# Patient Record
Sex: FEMALE | Race: WHITE | Employment: FULL TIME | URBAN - METROPOLITAN AREA
[De-identification: names, ages, dates, MRNs, and addresses within clinical notes are randomized per-mention and may not be internally consistent; named-entity substitution may affect disease eponyms.]

---

## 2017-06-14 ENCOUNTER — HOSPITAL ENCOUNTER (EMERGENCY)
Age: 47
Discharge: HOME | End: 2017-06-14
Attending: EMERGENCY MEDICINE
Payer: COMMERCIAL

## 2017-06-14 VITALS
WEIGHT: 136.24 LBS | HEART RATE: 88 BPM | OXYGEN SATURATION: 99 % | HEIGHT: 66 IN | TEMPERATURE: 98.3 F | DIASTOLIC BLOOD PRESSURE: 46 MMHG | RESPIRATION RATE: 12 BRPM | BODY MASS INDEX: 21.9 KG/M2 | SYSTOLIC BLOOD PRESSURE: 95 MMHG

## 2017-06-14 DIAGNOSIS — F43.0 ACUTE STRESS REACTION: ICD-10-CM

## 2017-06-14 DIAGNOSIS — R11.0 NAUSEA WITHOUT VOMITING: Primary | ICD-10-CM

## 2017-06-14 DIAGNOSIS — F19.10 SUBSTANCE ABUSE (HCC): ICD-10-CM

## 2017-06-14 LAB
ALBUMIN SERPL BCP-MCNC: 3.4 G/DL (ref 3.5–5)
ALBUMIN/GLOB SERPL: 0.9 {RATIO} (ref 1.1–2.2)
ALP SERPL-CCNC: 44 U/L (ref 45–117)
ALT SERPL-CCNC: 19 U/L (ref 12–78)
ANION GAP BLD CALC-SCNC: 15 MMOL/L (ref 5–15)
ANION GAP BLD CALC-SCNC: 6 MMOL/L (ref 5–15)
APPEARANCE UR: ABNORMAL
AST SERPL W P-5'-P-CCNC: 33 U/L (ref 15–37)
BACTERIA URNS QL MICRO: ABNORMAL /HPF
BASOPHILS # BLD AUTO: 0 K/UL (ref 0–0.1)
BASOPHILS # BLD: 0 % (ref 0–1)
BILIRUB SERPL-MCNC: 0.3 MG/DL (ref 0.2–1)
BILIRUB UR QL: NEGATIVE
BUN BLD-MCNC: 10 MG/DL (ref 9–20)
BUN SERPL-MCNC: 9 MG/DL (ref 6–20)
BUN/CREAT SERPL: 16 (ref 12–20)
CA-I BLD-MCNC: 1.05 MMOL/L (ref 1.12–1.32)
CALCIUM SERPL-MCNC: 9.1 MG/DL (ref 8.5–10.1)
CHLORIDE BLD-SCNC: 106 MMOL/L (ref 98–107)
CHLORIDE SERPL-SCNC: 107 MMOL/L (ref 97–108)
CO2 BLD-SCNC: 25 MMOL/L (ref 21–32)
CO2 SERPL-SCNC: 25 MMOL/L (ref 21–32)
COLOR UR: ABNORMAL
CREAT BLD-MCNC: 0.5 MG/DL (ref 0.6–1.3)
CREAT SERPL-MCNC: 0.56 MG/DL (ref 0.55–1.02)
EOSINOPHIL # BLD: 0.1 K/UL (ref 0–0.4)
EOSINOPHIL NFR BLD: 1 % (ref 0–7)
EPITH CASTS URNS QL MICRO: ABNORMAL /LPF
ERYTHROCYTE [DISTWIDTH] IN BLOOD BY AUTOMATED COUNT: 12.7 % (ref 11.5–14.5)
GLOBULIN SER CALC-MCNC: 3.8 G/DL (ref 2–4)
GLUCOSE BLD-MCNC: 139 MG/DL (ref 65–100)
GLUCOSE SERPL-MCNC: 75 MG/DL (ref 65–100)
GLUCOSE UR STRIP.AUTO-MCNC: NEGATIVE MG/DL
HCG UR QL: NEGATIVE
HCT VFR BLD AUTO: 40.9 % (ref 35–47)
HCT VFR BLD CALC: 36 % (ref 35–47)
HGB BLD-MCNC: 12.2 GM/DL (ref 11.5–16)
HGB BLD-MCNC: 13.6 G/DL (ref 11.5–16)
HGB UR QL STRIP: ABNORMAL
KETONES UR QL STRIP.AUTO: ABNORMAL MG/DL
LEUKOCYTE ESTERASE UR QL STRIP.AUTO: NEGATIVE
LIPASE SERPL-CCNC: 102 U/L (ref 73–393)
LYMPHOCYTES # BLD AUTO: 31 % (ref 12–49)
LYMPHOCYTES # BLD: 2.4 K/UL (ref 0.8–3.5)
MCH RBC QN AUTO: 29.6 PG (ref 26–34)
MCHC RBC AUTO-ENTMCNC: 33.3 G/DL (ref 30–36.5)
MCV RBC AUTO: 88.9 FL (ref 80–99)
MONOCYTES # BLD: 0.6 K/UL (ref 0–1)
MONOCYTES NFR BLD AUTO: 7 % (ref 5–13)
NEUTS SEG # BLD: 4.7 K/UL (ref 1.8–8)
NEUTS SEG NFR BLD AUTO: 61 % (ref 32–75)
NITRITE UR QL STRIP.AUTO: NEGATIVE
PH UR STRIP: 6 [PH] (ref 5–8)
PLATELET # BLD AUTO: 341 K/UL (ref 150–400)
POTASSIUM BLD-SCNC: 5.1 MMOL/L (ref 3.5–5.1)
POTASSIUM SERPL-SCNC: 5.6 MMOL/L (ref 3.5–5.1)
PROT SERPL-MCNC: 7.2 G/DL (ref 6.4–8.2)
PROT UR STRIP-MCNC: ABNORMAL MG/DL
RBC # BLD AUTO: 4.6 M/UL (ref 3.8–5.2)
RBC #/AREA URNS HPF: ABNORMAL /HPF (ref 0–5)
SERVICE CMNT-IMP: ABNORMAL
SODIUM BLD-SCNC: 140 MMOL/L (ref 136–145)
SODIUM SERPL-SCNC: 138 MMOL/L (ref 136–145)
SP GR UR REFRACTOMETRY: 1.02 (ref 1–1.03)
UA: UC IF INDICATED,UAUC: ABNORMAL
UROBILINOGEN UR QL STRIP.AUTO: 0.2 EU/DL (ref 0.2–1)
WBC # BLD AUTO: 7.9 K/UL (ref 3.6–11)
WBC URNS QL MICRO: ABNORMAL /HPF (ref 0–4)

## 2017-06-14 PROCEDURE — 96375 TX/PRO/DX INJ NEW DRUG ADDON: CPT

## 2017-06-14 PROCEDURE — 36415 COLL VENOUS BLD VENIPUNCTURE: CPT | Performed by: PHYSICIAN ASSISTANT

## 2017-06-14 PROCEDURE — 99284 EMERGENCY DEPT VISIT MOD MDM: CPT

## 2017-06-14 PROCEDURE — 81001 URINALYSIS AUTO W/SCOPE: CPT | Performed by: PHYSICIAN ASSISTANT

## 2017-06-14 PROCEDURE — 96361 HYDRATE IV INFUSION ADD-ON: CPT

## 2017-06-14 PROCEDURE — 80047 BASIC METABLC PNL IONIZED CA: CPT

## 2017-06-14 PROCEDURE — 80053 COMPREHEN METABOLIC PANEL: CPT | Performed by: PHYSICIAN ASSISTANT

## 2017-06-14 PROCEDURE — 81025 URINE PREGNANCY TEST: CPT | Performed by: PHYSICIAN ASSISTANT

## 2017-06-14 PROCEDURE — 87086 URINE CULTURE/COLONY COUNT: CPT | Performed by: PHYSICIAN ASSISTANT

## 2017-06-14 PROCEDURE — 93005 ELECTROCARDIOGRAM TRACING: CPT

## 2017-06-14 PROCEDURE — 74011250637 HC RX REV CODE- 250/637: Performed by: EMERGENCY MEDICINE

## 2017-06-14 PROCEDURE — 74011250636 HC RX REV CODE- 250/636

## 2017-06-14 PROCEDURE — 85025 COMPLETE CBC W/AUTO DIFF WBC: CPT | Performed by: PHYSICIAN ASSISTANT

## 2017-06-14 PROCEDURE — 96374 THER/PROPH/DIAG INJ IV PUSH: CPT

## 2017-06-14 PROCEDURE — 74011250636 HC RX REV CODE- 250/636: Performed by: EMERGENCY MEDICINE

## 2017-06-14 PROCEDURE — 83690 ASSAY OF LIPASE: CPT | Performed by: PHYSICIAN ASSISTANT

## 2017-06-14 RX ORDER — CLONIDINE HYDROCHLORIDE 0.1 MG/1
0.1 TABLET ORAL
Status: COMPLETED | OUTPATIENT
Start: 2017-06-14 | End: 2017-06-14

## 2017-06-14 RX ORDER — HYDROXYZINE 25 MG/1
25 TABLET, FILM COATED ORAL
Qty: 15 TAB | Refills: 0 | Status: SHIPPED | OUTPATIENT
Start: 2017-06-14 | End: 2017-06-14

## 2017-06-14 RX ORDER — CLONIDINE HYDROCHLORIDE 0.1 MG/1
0.1 TABLET ORAL
Qty: 14 TAB | Refills: 0 | Status: SHIPPED | OUTPATIENT
Start: 2017-06-14 | End: 2017-09-20 | Stop reason: ALTCHOICE

## 2017-06-14 RX ORDER — HYDROXYZINE 25 MG/1
25 TABLET, FILM COATED ORAL
Status: COMPLETED | OUTPATIENT
Start: 2017-06-14 | End: 2017-06-14

## 2017-06-14 RX ORDER — ONDANSETRON 2 MG/ML
4 INJECTION INTRAMUSCULAR; INTRAVENOUS
Status: COMPLETED | OUTPATIENT
Start: 2017-06-14 | End: 2017-06-14

## 2017-06-14 RX ORDER — LORAZEPAM 0.5 MG/1
1 TABLET ORAL
Qty: 6 TAB | Refills: 0 | Status: SHIPPED | OUTPATIENT
Start: 2017-06-14 | End: 2017-06-16

## 2017-06-14 RX ORDER — CLONIDINE HYDROCHLORIDE 0.1 MG/1
0.1 TABLET ORAL
Qty: 14 TAB | Refills: 0 | Status: SHIPPED | OUTPATIENT
Start: 2017-06-14 | End: 2017-06-14

## 2017-06-14 RX ORDER — HYDROXYZINE 25 MG/1
25 TABLET, FILM COATED ORAL
Qty: 15 TAB | Refills: 0 | Status: SHIPPED | OUTPATIENT
Start: 2017-06-14 | End: 2017-06-19

## 2017-06-14 RX ORDER — ONDANSETRON 2 MG/ML
INJECTION INTRAMUSCULAR; INTRAVENOUS
Status: COMPLETED
Start: 2017-06-14 | End: 2017-06-14

## 2017-06-14 RX ORDER — LORAZEPAM 2 MG/ML
1 INJECTION INTRAMUSCULAR ONCE
Status: COMPLETED | OUTPATIENT
Start: 2017-06-14 | End: 2017-06-14

## 2017-06-14 RX ORDER — SODIUM CHLORIDE 9 MG/ML
2000 INJECTION, SOLUTION INTRAVENOUS ONCE
Status: COMPLETED | OUTPATIENT
Start: 2017-06-14 | End: 2017-06-14

## 2017-06-14 RX ADMIN — LORAZEPAM 1 MG: 2 INJECTION INTRAMUSCULAR; INTRAVENOUS at 22:03

## 2017-06-14 RX ADMIN — HYDROXYZINE HYDROCHLORIDE 25 MG: 25 TABLET, FILM COATED ORAL at 21:18

## 2017-06-14 RX ADMIN — CLONIDINE HYDROCHLORIDE 0.1 MG: 0.1 TABLET ORAL at 22:39

## 2017-06-14 RX ADMIN — ONDANSETRON 4 MG: 2 INJECTION INTRAMUSCULAR; INTRAVENOUS at 21:18

## 2017-06-14 RX ADMIN — SODIUM CHLORIDE 2000 ML: 900 INJECTION, SOLUTION INTRAVENOUS at 21:17

## 2017-06-14 RX ADMIN — ONDANSETRON HYDROCHLORIDE 4 MG: 2 INJECTION, SOLUTION INTRAMUSCULAR; INTRAVENOUS at 21:18

## 2017-06-14 RX ADMIN — CLONIDINE HYDROCHLORIDE 0.1 MG: 0.1 TABLET ORAL at 21:18

## 2017-06-14 NOTE — LETTER
[address] 
[facility] EMERGENCY DEPT 
[address] [place] [address] [address] [id number] [phone] Work/School Note Date: 6/14/2017 To Whom It May concern: 
 
Radha Barrios was seen and treated today in the emergency room by the following provider(s): 
Attending Provider: Chaim Birch. Blake Lennox, MD. Radha Barrios may return to work on 6/16/17. Sincerely, Chaim Birch. Blake Lennox, MD

## 2017-06-15 LAB
ATRIAL RATE: 88 BPM
CALCULATED P AXIS, ECG09: 67 DEGREES
CALCULATED R AXIS, ECG10: 73 DEGREES
CALCULATED T AXIS, ECG11: 52 DEGREES
DIAGNOSIS, 93000: NORMAL
P-R INTERVAL, ECG05: 126 MS
Q-T INTERVAL, ECG07: 352 MS
QRS DURATION, ECG06: 82 MS
QTC CALCULATION (BEZET), ECG08: 425 MS
VENTRICULAR RATE, ECG03: 88 BPM

## 2017-06-15 NOTE — ED PROVIDER NOTES
HPI Comments: Megan Armas is a 48 y.o. female with no relevant PMHx who presents ambulatory to the ED c/o nausea and dry heaving since yesterday. Pt reports nausea has resolved since arrival to ED. She also notes anxiety stating her mother has ovarian cancer and she is facing marital problems and drug abuse. Pt states she takes suboxone and percocet. She also reports taking antidepressants including wellbutrin. Pt denies suicidal and homicidal ideation and intent, but admits that \"she rather be dead than feel like this. \" She specifically denies fever, chills, diarrhea, CP, cough, SOB, abdominal pain and HA. Social hx: - Tobacco use, - EtOH use, + Illicit drug use    PCP: Kanwal Sommer MD    There are no other complaints, changes or physical findings at this time. The history is provided by the patient. No  was used. Past Medical History:   Diagnosis Date    Sun-damaged skin     Sunburn, blistering     Tanning bed exposure        History reviewed. No pertinent surgical history. History reviewed. No pertinent family history. Social History     Social History    Marital status: UNKNOWN     Spouse name: N/A    Number of children: N/A    Years of education: N/A     Occupational History    Not on file. Social History Main Topics    Smoking status: Never Smoker    Smokeless tobacco: Never Used    Alcohol use Yes    Drug use: Yes     Special: Cocaine, Prescription    Sexual activity: Not on file     Other Topics Concern    Not on file     Social History Narrative         ALLERGIES: Sulfa (sulfonamide antibiotics)    Review of Systems   Constitutional: Negative for chills and fever. HENT: Negative for congestion, ear pain, rhinorrhea and sore throat. Eyes: Negative for pain and visual disturbance. Respiratory: Negative for cough, chest tightness, shortness of breath and wheezing.     Cardiovascular: Negative for chest pain, palpitations and leg swelling. Gastrointestinal: Positive for nausea. Negative for abdominal pain, blood in stool, constipation and diarrhea. Positive for dry heaving   Endocrine: Negative for polyuria. Genitourinary: Negative for dysuria, frequency and hematuria. Musculoskeletal: Negative for back pain, myalgias and neck pain. Skin: Negative for color change and rash. Allergic/Immunologic: Negative for immunocompromised state. Neurological: Negative for dizziness, light-headedness, numbness and headaches. Psychiatric/Behavioral: Negative for suicidal ideas. The patient is nervous/anxious. Vitals:    06/14/17 2215 06/14/17 2216 06/14/17 2239 06/14/17 2307   BP: 108/63  108/63 95/46   Pulse:   88    Resp:    12   Temp:       SpO2:  99%     Weight:       Height:                Physical Exam   Nursing note and vitals reviewed. General appearance: non-toxic, NAD  Eyes: PERRL, EOMI, conjunctiva normal, anicteric sclera  HEENT: mucous membranes moist, oropharynx is clear  Pulmonary: clear to auscultation bilaterally  Cardiac: normal rate and regular rhythm, no murmurs, gallops, or rubs, 2+DP pulses, 2+ radial pulses  Abdomen: soft, nontender, nondistended, bowel sounds present  MSK: no pre-tibial edema  Neuro: Alert, answers questions appropriately  Skin: capillary refill brisk      MDM  Number of Diagnoses or Management Options  Acute stress reaction:   Nausea without vomiting:   Substance abuse:   Diagnosis management comments: DDx: depression, anxiety, substance abuse, narcotic withdrawal, pancreatitis    Feels improved; labs reviewed. No SI/HI, pt states that she needs to leave her current living situation w/ her . Agreeable w/ POC to try & minimize w/drawal symptoms. Advised to f/u with PCP and substance abuse.        Amount and/or Complexity of Data Reviewed  Clinical lab tests: ordered and reviewed  Tests in the medicine section of CPT®: ordered and reviewed  Review and summarize past medical records: yes  Independent visualization of images, tracings, or specimens: yes    Patient Progress  Patient progress: stable    ED Course       Procedures    EKG interpretation: 18:44  Rhythm: normal sinus rhythm; and regular . Rate (approx.): 88 bpm; Axis: normal; ID interval: normal; QRS interval: normal ; ST/T wave: non specific ST/T changes. 10:29 PM  Pt reports feeling better and is ready to go home. LABORATORY TESTS:  Recent Results (from the past 12 hour(s))   EKG, 12 LEAD, INITIAL    Collection Time: 06/14/17  6:44 PM   Result Value Ref Range    Ventricular Rate 88 BPM    Atrial Rate 88 BPM    P-R Interval 126 ms    QRS Duration 82 ms    Q-T Interval 352 ms    QTC Calculation (Bezet) 425 ms    Calculated P Axis 67 degrees    Calculated R Axis 73 degrees    Calculated T Axis 52 degrees    Diagnosis       Normal sinus rhythm  Possible Left atrial enlargement  Nonspecific ST abnormality  No previous ECGs available     CBC WITH AUTOMATED DIFF    Collection Time: 06/14/17  8:44 PM   Result Value Ref Range    WBC 7.9 3.6 - 11.0 K/uL    RBC 4.60 3.80 - 5.20 M/uL    HGB 13.6 11.5 - 16.0 g/dL    HCT 40.9 35.0 - 47.0 %    MCV 88.9 80.0 - 99.0 FL    MCH 29.6 26.0 - 34.0 PG    MCHC 33.3 30.0 - 36.5 g/dL    RDW 12.7 11.5 - 14.5 %    PLATELET 767 787 - 832 K/uL    NEUTROPHILS 61 32 - 75 %    LYMPHOCYTES 31 12 - 49 %    MONOCYTES 7 5 - 13 %    EOSINOPHILS 1 0 - 7 %    BASOPHILS 0 0 - 1 %    ABS. NEUTROPHILS 4.7 1.8 - 8.0 K/UL    ABS. LYMPHOCYTES 2.4 0.8 - 3.5 K/UL    ABS. MONOCYTES 0.6 0.0 - 1.0 K/UL    ABS. EOSINOPHILS 0.1 0.0 - 0.4 K/UL    ABS.  BASOPHILS 0.0 0.0 - 0.1 K/UL   METABOLIC PANEL, COMPREHENSIVE    Collection Time: 06/14/17  8:44 PM   Result Value Ref Range    Sodium 138 136 - 145 mmol/L    Potassium 5.6 (H) 3.5 - 5.1 mmol/L    Chloride 107 97 - 108 mmol/L    CO2 25 21 - 32 mmol/L    Anion gap 6 5 - 15 mmol/L    Glucose 75 65 - 100 mg/dL    BUN 9 6 - 20 MG/DL    Creatinine 0.56 0.55 - 1.02 MG/DL BUN/Creatinine ratio 16 12 - 20      GFR est AA >60 >60 ml/min/1.73m2    GFR est non-AA >60 >60 ml/min/1.73m2    Calcium 9.1 8.5 - 10.1 MG/DL    Bilirubin, total 0.3 0.2 - 1.0 MG/DL    ALT (SGPT) 19 12 - 78 U/L    AST (SGOT) 33 15 - 37 U/L    Alk. phosphatase 44 (L) 45 - 117 U/L    Protein, total 7.2 6.4 - 8.2 g/dL    Albumin 3.4 (L) 3.5 - 5.0 g/dL    Globulin 3.8 2.0 - 4.0 g/dL    A-G Ratio 0.9 (L) 1.1 - 2.2     LIPASE    Collection Time: 06/14/17  8:44 PM   Result Value Ref Range    Lipase 102 73 - 393 U/L   URINALYSIS W/ REFLEX CULTURE    Collection Time: 06/14/17  8:44 PM   Result Value Ref Range    Color YELLOW/STRAW      Appearance CLOUDY (A) CLEAR      Specific gravity 1.025 1.003 - 1.030      pH (UA) 6.0 5.0 - 8.0      Protein TRACE (A) NEG mg/dL    Glucose NEGATIVE  NEG mg/dL    Ketone TRACE (A) NEG mg/dL    Bilirubin NEGATIVE  NEG      Blood MODERATE (A) NEG      Urobilinogen 0.2 0.2 - 1.0 EU/dL    Nitrites NEGATIVE  NEG      Leukocyte Esterase NEGATIVE  NEG      WBC 5-10 0 - 4 /hpf    RBC 10-20 0 - 5 /hpf    Epithelial cells MODERATE (A) FEW /lpf    Bacteria 1+ (A) NEG /hpf    UA:UC IF INDICATED URINE CULTURE ORDERED (A) CNI     HCG URINE, QL    Collection Time: 06/14/17  8:44 PM   Result Value Ref Range    HCG urine, Ql. NEGATIVE  NEG     POC CHEM8    Collection Time: 06/14/17 10:39 PM   Result Value Ref Range    Calcium, ionized (POC) 1.05 (L) 1.12 - 1.32 MMOL/L    Sodium (POC) 140 136 - 145 MMOL/L    Potassium (POC) 5.1 3.5 - 5.1 MMOL/L    Chloride (POC) 106 98 - 107 MMOL/L    CO2 (POC) 25 21 - 32 MMOL/L    Anion gap (POC) 15 5 - 15 mmol/L    Glucose (POC) 139 (H) 65 - 100 MG/DL    BUN (POC) 10 9 - 20 MG/DL    Creatinine (POC) 0.5 (L) 0.6 - 1.3 MG/DL    GFR-AA (POC) >60 >60 ml/min/1.73m2    GFR, non-AA (POC) >60 >60 ml/min/1.73m2    Hemoglobin (POC) 12.2 11.5 - 16.0 GM/DL    Hematocrit (POC) 36 35.0 - 47.0 %    Comment Comment Not Indicated.          MEDICATIONS GIVEN:  Medications   cloNIDine HCl (CATAPRES) tablet 0.1 mg (0.1 mg Oral Given 17)   0.9% sodium chloride infusion 2,000 mL (0 mL IntraVENous IV Completed 17)   hydrOXYzine HCl (ATARAX) tablet 25 mg (25 mg Oral Given 17)   ondansetron [facility]) injection 4 mg (4 mg IntraVENous Given 17)   LORazepam (ATIVAN) injection 1 mg (1 mg IntraVENous Given 17)   cloNIDine HCl (CATAPRES) tablet 0.1 mg (0.1 mg Oral Given 17)       IMPRESSION:  1. Nausea without vomiting    2. Substance abuse    3. Acute stress reaction        PLAN: Discharge home  1. Current Discharge Medication List      START taking these medications    Details   LORazepam (ATIVAN) 0.5 mg tablet Take 2 Tabs by mouth every eight (8) hours as needed for Anxiety for up to 2 days. Max Daily Amount: 3 mg. Indications: anxiety, NAUSEA  Qty: 6 Tab, Refills: 0      cloNIDine HCl (CATAPRES) 0.1 mg tablet Take 1 Tab by mouth two (2) times daily as needed. Indications: OPIOID WITHDRAWAL SYMPTOMS, STOP TAKING IF YOU FEEL WEAK, DIZZY, OR FAINT  Qty: 14 Tab, Refills: 0      hydrOXYzine HCl (ATARAX) 25 mg tablet Take 1 Tab by mouth three (3) times daily as needed for Itching for up to 5 days. Indications: anxiety  Qty: 15 Tab, Refills: 0         STOP taking these medications       HYDROcodone-acetaminophen 5-500 mg cap Comments:   Reason for Stoppin.   Follow-up Information     Follow up With Details Comments Contact Info    [facility] EMERGENCY DEPT Go in 1 day If symptoms worsen [address] Manoj Hernandez MD Schedule an appointment as soon as possible for a visit in 2 days  [address]  [phone]          3. Return to ED if worse     DISCHARGE NOTE  10:44 PM  The patient has been re-evaluated and is ready for discharge. Reviewed available results with patient. Counseled pt on diagnosis and care plan.  Pt has expressed understanding, and all questions have been answered. Pt agrees with plan and agrees to F/U as recommended, or return to the ED if their sxs worsen. Discharge instructions have been provided and explained to the pt, along with reasons to return to the ED. This note is prepared by Freedom Alvarez, acting as Scribe for Marry Blue. Larry Pollock MD.    Marry Blue. Larry Pollock MD: The scribe's documentation has been prepared under my direction and personally reviewed by me in its entirety. I confirm that the note above accurately reflects all work, treatment, procedures, and medical decision making performed by me.

## 2017-06-15 NOTE — ED NOTES
MD Imer Biggs at bedside to verbally discharge the patient from ED to home with family. Discharge paperwork, follow up care, and medication education done.

## 2017-06-15 NOTE — DISCHARGE INSTRUCTIONS
Nausea and Vomiting: Care Instructions  Your Care Instructions    When you are nauseated, you may feel weak and sweaty and notice a lot of saliva in your mouth. Nausea often leads to vomiting. Most of the time you do not need to worry about nausea and vomiting, but they can be signs of other illnesses. Two common causes of nausea and vomiting are stomach flu and food poisoning. Nausea and vomiting from viral stomach flu will usually start to improve within 24 hours. Nausea and vomiting from food poisoning may last from 12 to 48 hours. The doctor has checked you carefully, but problems can develop later. If you notice any problems or new symptoms, get medical treatment right away. Follow-up care is a key part of your treatment and safety. Be sure to make and go to all appointments, and call your doctor if you are having problems. It's also a good idea to know your test results and keep a list of the medicines you take. How can you care for yourself at home? · To prevent dehydration, drink plenty of fluids, enough so that your urine is light yellow or clear like water. Choose water and other caffeine-free clear liquids until you feel better. If you have kidney, heart, or liver disease and have to limit fluids, talk with your doctor before you increase the amount of fluids you drink. · Rest in bed until you feel better. · When you are able to eat, try clear soups, mild foods, and liquids until all symptoms are gone for 12 to 48 hours. Other good choices include dry toast, crackers, cooked cereal, and gelatin dessert, such as Jell-O. When should you call for help? Call 911 anytime you think you may need emergency care. For example, call if:  · You passed out (lost consciousness). Call your doctor now or seek immediate medical care if:  · You have symptoms of dehydration, such as:  ¨ Dry eyes and a dry mouth. ¨ Passing only a little dark urine.   ¨ Feeling thirstier than usual.  · You have new or worsening belly pain. · You have a new or higher fever. · You vomit blood or what looks like coffee grounds. Watch closely for changes in your health, and be sure to contact your doctor if:  · You have ongoing nausea and vomiting. · Your vomiting is getting worse. · Your vomiting lasts longer than 2 days. · You are not getting better as expected. Where can you learn more? Go to http://jorge-keerthi.info/. Enter [phone] [id number] in the search box to learn more about \"Nausea and Vomiting: Care Instructions. \"  Current as of: May 27, 2016  Content Version: 11.2  © 0965-9485 [employer]. Care instructions adapted under license by [employer] (which disclaims liability or warranty for this information). If you have questions about a medical condition or this instruction, always ask your healthcare professional. [address] any warranty or liability for your use of this information.

## 2017-06-16 LAB
BACTERIA SPEC CULT: NORMAL
CC UR VC: NORMAL
SERVICE CMNT-IMP: NORMAL

## 2017-09-20 ENCOUNTER — OFFICE VISIT (OUTPATIENT)
Dept: INTERNAL MEDICINE CLINIC | Age: 47
End: 2017-09-20

## 2017-09-20 VITALS
DIASTOLIC BLOOD PRESSURE: 80 MMHG | BODY MASS INDEX: 21.57 KG/M2 | HEART RATE: 88 BPM | SYSTOLIC BLOOD PRESSURE: 128 MMHG | HEIGHT: 66 IN | WEIGHT: 134.2 LBS

## 2017-09-20 DIAGNOSIS — F41.9 ANXIETY: Primary | ICD-10-CM

## 2017-09-20 DIAGNOSIS — F32.A DEPRESSION, UNSPECIFIED DEPRESSION TYPE: ICD-10-CM

## 2017-09-20 PROBLEM — G43.909 MIGRAINE HEADACHE: Status: ACTIVE | Noted: 2017-09-20

## 2017-09-20 PROBLEM — B19.20 HEPATITIS C: Status: ACTIVE | Noted: 2017-09-20

## 2017-09-20 RX ORDER — LORAZEPAM 0.5 MG/1
0.5 TABLET ORAL
Qty: 30 TAB | Refills: 0 | Status: SHIPPED | OUTPATIENT
Start: 2017-09-20 | End: 2017-10-06 | Stop reason: SDUPTHER

## 2017-09-20 RX ORDER — NORETHINDRONE ACETATE AND ETHINYL ESTRADIOL, AND FERROUS FUMARATE 1MG-20(24)
KIT ORAL
COMMUNITY
End: 2020-06-16

## 2017-09-20 NOTE — PROGRESS NOTES
This note will not be viewable in [address] Subjective:     Cheryl Eagle presents to the office today with complaints of anxiety. Patient has a history of depression and anxiety and is currently taking Celexa and Wellbutrin. The patient has been having some medical problems recently and is being followed by her GYN for this. Her mother has had a positive BRACA test and the patient has worried about the possibility of a genetic predisposition to breast and ovarian cancer. Previously she has been on Xanax but could not tolerate it due to sedation. She has taken some Ativan in the past and this has worked well the very low dose to help her with stomach issues which developed with her anxiety. The patient generally would only take the medication infrequently and not on a daily basis. She has never had any impairment issues with the medication. Past Medical History:   Diagnosis Date    Anxiety 9/20/2017    Depression 9/20/2017    Hepatitis C 9/20/2017    Treated; in remission    Migraine headache 9/20/2017    Sun-damaged skin     Sunburn, blistering     Tanning bed exposure      History reviewed. No pertinent surgical history. Allergies   Allergen Reactions    Sulfa (Sulfonamide Antibiotics) Hives     Current Outpatient Prescriptions   Medication Sig Dispense Refill    buPROPion XL (WELLBUTRIN XL) 150 mg tablet Take 150 mg by mouth every morning.  citalopram (CELEXA) 20 mg tablet Take  by mouth daily.  norethindrone-e.estradiol-iron (TAYTULLA) 1 mg-20 mcg (24)/75 mg (4) cap Take  by mouth.  Estradiol (DIVIGEL) 1 mg/gram (0.1 %) glpk by TransDERmal route.  LORazepam (ATIVAN) 0.5 mg tablet Take 1 Tab by mouth every eight (8) hours as needed for Anxiety.  Max Daily Amount: 1.5 mg. 30 Tab 0     Social History     Social History    Marital status: SINGLE     Spouse name: N/A    Number of children: N/A    Years of education: N/A     Social History Main Topics    Smoking status: Never Smoker    Smokeless tobacco: Never Used    Alcohol use Yes    Drug use: Yes     Special: Cocaine, Prescription    Sexual activity: Not Asked     Other Topics Concern    None     Social History Narrative     Family History   Problem Relation Age of Onset    No Known Problems Mother     No Known Problems Father        Review of Systems:  GEN: no weight loss, weight gain, fatigue or night sweats  CV: no PND, orthopnea, or palpitations  Resp: no dyspnea on exertion, no cough  Abd: positive for abdominal pain with anxiety  EXT: denies edema, claudication  Psych: Positive for nervousness, anxiety, tremor  Neurological ROS: no TIA or stroke symptoms  ROS otherwise negative      Objective:     Visit Vitals    /80 (BP 1 Location: Left arm, BP Patient Position: Sitting)    Pulse 88    Ht 5' 6\" (1.676 m)    Wt 134 lb 3.2 oz (60.9 kg)    BMI 21.66 kg/m2     Body mass index is 21.66 kg/(m^2). General:   alert, cooperative and no distress   Psych: Alert, mood normal, insight good. Cognition and concentration normal   Neuro: ..alert, oriented x3,speech normal in context and clarity, cranial nerves II-XII intact,motor strength: full proximally and distally,gait: normal  reflexes: full and symmetric     Physical exam otherwise negative         Assessment/Plan:     Diagnoses and all orders for this visit:    Anxiety  -     LORazepam (ATIVAN) 0.5 mg tablet; Take 1 Tab by mouth every eight (8) hours as needed for Anxiety. Max Daily Amount: 1.5 mg., Print, Disp-30 Tab, R-0    Depression, unspecified depression type        Other instructions: The patient's medications are reviewed and reconciled. A 15 minute face-to-face office visit was spent with the patient discussing her medical issues, her anxiety, her stomach problems related to her anxiety and her response to previous medications. Have given her a prescription for Ativan to be taken on a as needed basis as directed.     Follow-up here to be determined    Follow-up Disposition:  Return for as needed.     Renzo Garrison MD

## 2017-09-20 NOTE — PATIENT INSTRUCTIONS

## 2017-09-20 NOTE — PROGRESS NOTES
Nancy Britt is a 50 y.o. female presenting for Anxiety  . 1. Have you been to the ER, urgent care clinic since your last visit? Hospitalized since your last visit? Yes When: June Where: [facility] Reason for visit: anxiety    2. Have you seen or consulted any other health care providers outside of the [address] [place] [address] since your last visit? Include any pap smears or colon screening. YES- GYN today   No flowsheet data found. No flowsheet data found. No flowsheet data found. There are no discontinued medications.

## 2017-09-20 NOTE — MR AVS SNAPSHOT
Visit Information Date & Time Provider Department Dept. Phone Encounter #  
 9/20/2017  2:40 PM Shital Villarreal MD [facility] [id number] Follow-up Instructions Return for as needed. Upcoming Health Maintenance Date Due DTaP/Tdap/Td series (1 - Tdap) 9/6/1989 PAP AKA CERVICAL CYTOLOGY 9/6/1989 INFLUENZA AGE 9 TO ADULT 8/1/2017 Allergies as of 9/20/2017  Review Complete On: 9/20/2017 By: Shital Villarreal MD  
  
 Severity Noted Reaction Type Reactions Sulfa (Sulfonamide Antibiotics)  11/07/2013    Hives Current Immunizations  Never Reviewed No immunizations on file. Not reviewed this visit You Were Diagnosed With   
  
 Codes Comments Anxiety    -  Primary ICD-10-CM: F41.9 ICD-9-CM: 300.00 Depression, unspecified depression type     ICD-10-CM: F32.9 ICD-9-CM: 016 Vitals BP Pulse Height(growth percentile) Weight(growth percentile) BMI OB Status 128/80 (BP 1 Location: Left arm, BP Patient Position: Sitting) 88 5' 6\" (1.676 m) 134 lb 3.2 oz (60.9 kg) 21.66 kg/m2 Menopause Smoking Status Never Smoker BMI and BSA Data Body Mass Index Body Surface Area  
 21.66 kg/m 2 1.68 m 2 Preferred Pharmacy Pharmacy Name Phone [facility] [address] [place] [address] [phone] Your Updated Medication List  
  
   
This list is accurate as of: 9/20/17  2:48 PM.  Always use your most recent med list.  
  
  
  
  
 CeleXA 20 mg tablet Generic drug:  citalopram  
Take  by mouth daily. DIVIGEL 1 mg/gram (0.1 %) Glpk Generic drug:  Estradiol  
by TransDERmal route. LORazepam 0.5 mg tablet Commonly known as:  ATIVAN Take 1 Tab by mouth every eight (8) hours as needed for Anxiety. Max Daily Amount: 1.5 mg.  
  
 TAYTULLA 1 mg-20 mcg (24)/75 mg (4) Cap Generic drug:  norethindrone-e.estradiol-iron Take  by mouth. WELLBUTRIN  mg tablet Generic drug:  buPROPion XL Take 150 mg by mouth every morning. Prescriptions Printed Refills LORazepam (ATIVAN) 0.5 mg tablet 0 Sig: Take 1 Tab by mouth every eight (8) hours as needed for Anxiety. Max Daily Amount: 1.5 mg.  
 Class: Print Route: Oral  
  
Follow-up Instructions Return for as needed. Introducing [facility]! [facility] introduces [employer] patient portal. Now you can access parts of your medical record, email your doctor's office, and request medication refills online. 1. In your internet browser, go to [email or web address] 2. Click on the First Time User? Click Here link in the Sign In box. You will see the New Member Sign Up page. 3. Enter your [employer] [facility] Access Code exactly as it appears below. You will not need to use this code after youve completed the sign-up process. If you do not sign up before the expiration date, you must request a new code. · [employer] [facility] Access Code: [email or web address] Expires: 12/19/2017  2:24 PM 
 
4. Enter the last four digits of your Social Security Number (xxxx) and Date of Birth (mm/dd/yyyy) as indicated and click Submit. You will be taken to the next sign-up page. 5. Create a [employer] [facility] ID. This will be your [employer] [facility] login ID and cannot be changed, so think of one that is secure and easy to remember. 6. Create a [employer] [facility] password. You can change your password at any time. 7. Enter your Password Reset Question and Answer. This can be used at a later time if you forget your password. 8. Enter your e-mail address. You will receive e-mail notification when new information is available in [address] 9. Click Sign Up. You can now view and download portions of your medical record. 10. Click the Download Summary menu link to download a portable copy of your medical information. If you have questions, please visit the Frequently Asked Questions section of the [employer] website. Remember, [employer] is NOT to be used for urgent needs. For medical emergencies, dial 911. Now available from your iPhone and Android! Please provide this summary of care documentation to your next provider. Your primary care clinician is listed as BECKA Melendez. If you have any questions after today's visit, please call [phone].

## 2017-10-02 ENCOUNTER — TELEPHONE (OUTPATIENT)
Dept: INTERNAL MEDICINE CLINIC | Age: 47
End: 2017-10-02

## 2017-10-02 DIAGNOSIS — F41.9 ANXIETY: Primary | ICD-10-CM

## 2017-10-02 NOTE — TELEPHONE ENCOUNTER
Patient phoned states that her celexa nor her Ativan are helping with her stress level.  Her mother was recently diagnosed with stage 4 cancer and she is not taking it very well and her medications are not working

## 2017-10-02 NOTE — TELEPHONE ENCOUNTER
Requested Prescriptions     Pending Prescriptions Disp Refills    citalopram (CELEXA) 40 mg tablet 30 Tab 1     Sig: Take 1 Tab by mouth daily.

## 2017-10-03 RX ORDER — CITALOPRAM 40 MG/1
40 TABLET, FILM COATED ORAL DAILY
Qty: 30 TAB | Refills: 1 | Status: SHIPPED | OUTPATIENT
Start: 2017-10-03 | End: 2018-04-12 | Stop reason: SDUPTHER

## 2017-10-06 DIAGNOSIS — F41.9 ANXIETY: ICD-10-CM

## 2017-10-06 NOTE — TELEPHONE ENCOUNTER
Last Refill: 9/20/17  Last visit:9/20/2017      Requested Prescriptions     Pending Prescriptions Disp Refills    LORazepam (ATIVAN) 0.5 mg tablet 30 Tab 0     Sig: Take 1 Tab by mouth every eight (8) hours as needed for Anxiety.  Max Daily Amount: 1.5 mg.       Eloise' and her have broken up and she is having a hard time coping between this and her mothers cancer diagnosis

## 2017-10-10 RX ORDER — LORAZEPAM 0.5 MG/1
0.5 TABLET ORAL
Qty: 30 TAB | Refills: 0 | OUTPATIENT
Start: 2017-10-10 | End: 2017-11-20 | Stop reason: SDUPTHER

## 2017-11-20 DIAGNOSIS — F41.9 ANXIETY: ICD-10-CM

## 2017-11-20 RX ORDER — LORAZEPAM 0.5 MG/1
0.5 TABLET ORAL
Qty: 30 TAB | Refills: 0 | OUTPATIENT
Start: 2017-11-20 | End: 2018-01-11 | Stop reason: SDUPTHER

## 2017-11-20 NOTE — TELEPHONE ENCOUNTER
Last Refill: 10/10/17  Last visit:9/20/2017      Requested Prescriptions     Pending Prescriptions Disp Refills    LORazepam (ATIVAN) 0.5 mg tablet 30 Tab 0     Sig: Take 1 Tab by mouth every eight (8) hours as needed for Anxiety.  Max Daily Amount: 1.5 mg.

## 2018-01-11 DIAGNOSIS — F41.9 ANXIETY: ICD-10-CM

## 2018-01-11 RX ORDER — LORAZEPAM 0.5 MG/1
TABLET ORAL
Qty: 30 TAB | Refills: 0 | Status: SHIPPED | OUTPATIENT
Start: 2018-01-11 | End: 2018-02-05 | Stop reason: SDUPTHER

## 2018-01-11 NOTE — TELEPHONE ENCOUNTER
Requested Prescriptions     Pending Prescriptions Disp Refills    LORazepam (ATIVAN) 0.5 mg tablet [Pharmacy Med Name: LORAZEPAM 0.5 MG TABLET] 30 Tab 0     Sig: take 1 tablet by mouth every 8 hours if needed       Last Refill: 11/21/17  Last visit:9/20/2017

## 2018-02-05 DIAGNOSIS — F41.9 ANXIETY: ICD-10-CM

## 2018-02-05 RX ORDER — LORAZEPAM 0.5 MG/1
TABLET ORAL
Qty: 30 TAB | Refills: 0 | Status: SHIPPED | OUTPATIENT
Start: 2018-02-05 | End: 2020-06-16

## 2018-02-05 NOTE — TELEPHONE ENCOUNTER
Last Refill: 1/11/18  Last visit:9/20/2017      Requested Prescriptions     Pending Prescriptions Disp Refills    LORazepam (ATIVAN) 0.5 mg tablet 30 Tab 0     Sig: take 1 tablet by mouth every 8 hours if needed

## 2020-06-16 ENCOUNTER — HOSPITAL ENCOUNTER (INPATIENT)
Age: 50
LOS: 6 days | Discharge: HOME | DRG: 773 | End: 2020-06-22
Attending: PSYCHIATRY & NEUROLOGY | Admitting: PSYCHIATRY & NEUROLOGY
Payer: COMMERCIAL

## 2020-06-16 ENCOUNTER — HOSPITAL ENCOUNTER (EMERGENCY)
Age: 50
Discharge: ANOTHER HOSPITAL | End: 2020-06-16
Attending: EMERGENCY MEDICINE
Payer: COMMERCIAL

## 2020-06-16 ENCOUNTER — APPOINTMENT (OUTPATIENT)
Dept: GENERAL RADIOLOGY | Age: 50
End: 2020-06-16
Attending: EMERGENCY MEDICINE
Payer: COMMERCIAL

## 2020-06-16 VITALS
BODY MASS INDEX: 23.53 KG/M2 | TEMPERATURE: 98.1 F | RESPIRATION RATE: 15 BRPM | WEIGHT: 146.39 LBS | HEART RATE: 72 BPM | HEIGHT: 66 IN | SYSTOLIC BLOOD PRESSURE: 140 MMHG | OXYGEN SATURATION: 96 % | DIASTOLIC BLOOD PRESSURE: 86 MMHG

## 2020-06-16 DIAGNOSIS — F19.94 SUBSTANCE INDUCED MOOD DISORDER (HCC): ICD-10-CM

## 2020-06-16 DIAGNOSIS — R45.851 SUICIDAL THOUGHTS: Primary | ICD-10-CM

## 2020-06-16 LAB
ALBUMIN SERPL-MCNC: 3.7 G/DL (ref 3.5–5)
ALBUMIN/GLOB SERPL: 0.9 {RATIO} (ref 1.1–2.2)
ALP SERPL-CCNC: 46 U/L (ref 45–117)
ALT SERPL-CCNC: 15 U/L (ref 12–78)
AMPHET UR QL SCN: NEGATIVE
ANION GAP SERPL CALC-SCNC: 6 MMOL/L (ref 5–15)
AST SERPL-CCNC: 15 U/L (ref 15–37)
BARBITURATES UR QL SCN: NEGATIVE
BASOPHILS # BLD: 0 K/UL (ref 0–0.1)
BASOPHILS NFR BLD: 0 % (ref 0–1)
BENZODIAZ UR QL: NEGATIVE
BILIRUB SERPL-MCNC: 0.6 MG/DL (ref 0.2–1)
BUN SERPL-MCNC: 6 MG/DL (ref 6–20)
BUN/CREAT SERPL: 8 (ref 12–20)
CALCIUM SERPL-MCNC: 8.7 MG/DL (ref 8.5–10.1)
CANNABINOIDS UR QL SCN: NEGATIVE
CHLORIDE SERPL-SCNC: 105 MMOL/L (ref 97–108)
CO2 SERPL-SCNC: 27 MMOL/L (ref 21–32)
COCAINE UR QL SCN: POSITIVE
COVID-19 RAPID TEST, COVR: NOT DETECTED
CREAT SERPL-MCNC: 0.74 MG/DL (ref 0.55–1.02)
DIFFERENTIAL METHOD BLD: NORMAL
DRUG SCRN COMMENT,DRGCM: ABNORMAL
EOSINOPHIL # BLD: 0.1 K/UL (ref 0–0.4)
EOSINOPHIL NFR BLD: 2 % (ref 0–7)
ERYTHROCYTE [DISTWIDTH] IN BLOOD BY AUTOMATED COUNT: 12.2 % (ref 11.5–14.5)
ETHANOL SERPL-MCNC: <10 MG/DL
GLOBULIN SER CALC-MCNC: 3.9 G/DL (ref 2–4)
GLUCOSE SERPL-MCNC: 84 MG/DL (ref 65–100)
HCT VFR BLD AUTO: 45.7 % (ref 35–47)
HGB BLD-MCNC: 14.7 G/DL (ref 11.5–16)
IMM GRANULOCYTES # BLD AUTO: 0 K/UL (ref 0–0.04)
IMM GRANULOCYTES NFR BLD AUTO: 0 % (ref 0–0.5)
LYMPHOCYTES # BLD: 1.3 K/UL (ref 0.8–3.5)
LYMPHOCYTES NFR BLD: 21 % (ref 12–49)
MCH RBC QN AUTO: 29.3 PG (ref 26–34)
MCHC RBC AUTO-ENTMCNC: 32.2 G/DL (ref 30–36.5)
MCV RBC AUTO: 91.2 FL (ref 80–99)
METHADONE UR QL: NEGATIVE
MONOCYTES # BLD: 0.4 K/UL (ref 0–1)
MONOCYTES NFR BLD: 6 % (ref 5–13)
NEUTS SEG # BLD: 4.3 K/UL (ref 1.8–8)
NEUTS SEG NFR BLD: 71 % (ref 32–75)
NRBC # BLD: 0 K/UL (ref 0–0.01)
NRBC BLD-RTO: 0 PER 100 WBC
OPIATES UR QL: POSITIVE
PCP UR QL: NEGATIVE
PLATELET # BLD AUTO: 248 K/UL (ref 150–400)
PMV BLD AUTO: 11.3 FL (ref 8.9–12.9)
POTASSIUM SERPL-SCNC: 4 MMOL/L (ref 3.5–5.1)
PROT SERPL-MCNC: 7.6 G/DL (ref 6.4–8.2)
RBC # BLD AUTO: 5.01 M/UL (ref 3.8–5.2)
SARS-COV-2, COV2: NOT DETECTED
SODIUM SERPL-SCNC: 138 MMOL/L (ref 136–145)
SOURCE, COVRS: NORMAL
SPECIMEN SOURCE, FCOV2M: NORMAL
SPECIMEN SOURCE, FCOV2M: NORMAL
WBC # BLD AUTO: 6.1 K/UL (ref 3.6–11)

## 2020-06-16 PROCEDURE — 74011250637 HC RX REV CODE- 250/637: Performed by: NURSE PRACTITIONER

## 2020-06-16 PROCEDURE — 87635 SARS-COV-2 COVID-19 AMP PRB: CPT

## 2020-06-16 PROCEDURE — 71046 X-RAY EXAM CHEST 2 VIEWS: CPT

## 2020-06-16 PROCEDURE — 85025 COMPLETE CBC W/AUTO DIFF WBC: CPT

## 2020-06-16 PROCEDURE — 96374 THER/PROPH/DIAG INJ IV PUSH: CPT

## 2020-06-16 PROCEDURE — 99284 EMERGENCY DEPT VISIT MOD MDM: CPT

## 2020-06-16 PROCEDURE — 74011250636 HC RX REV CODE- 250/636: Performed by: EMERGENCY MEDICINE

## 2020-06-16 PROCEDURE — 65220000003 HC RM SEMIPRIVATE PSYCH

## 2020-06-16 PROCEDURE — 90791 PSYCH DIAGNOSTIC EVALUATION: CPT

## 2020-06-16 PROCEDURE — 36415 COLL VENOUS BLD VENIPUNCTURE: CPT

## 2020-06-16 PROCEDURE — 80053 COMPREHEN METABOLIC PANEL: CPT

## 2020-06-16 PROCEDURE — 80307 DRUG TEST PRSMV CHEM ANLYZR: CPT

## 2020-06-16 RX ORDER — ONDANSETRON 2 MG/ML
4 INJECTION INTRAMUSCULAR; INTRAVENOUS
Status: COMPLETED | OUTPATIENT
Start: 2020-06-16 | End: 2020-06-16

## 2020-06-16 RX ORDER — TRAZODONE HYDROCHLORIDE 50 MG/1
50 TABLET ORAL
Status: DISCONTINUED | OUTPATIENT
Start: 2020-06-16 | End: 2020-06-21

## 2020-06-16 RX ORDER — ADHESIVE BANDAGE
30 BANDAGE TOPICAL DAILY PRN
Status: DISCONTINUED | OUTPATIENT
Start: 2020-06-16 | End: 2020-06-22 | Stop reason: HOSPADM

## 2020-06-16 RX ORDER — LOPERAMIDE HYDROCHLORIDE 2 MG/1
2 CAPSULE ORAL
Status: DISCONTINUED | OUTPATIENT
Start: 2020-06-16 | End: 2020-06-22 | Stop reason: HOSPADM

## 2020-06-16 RX ORDER — LORAZEPAM 2 MG/ML
1 INJECTION INTRAMUSCULAR
Status: DISCONTINUED | OUTPATIENT
Start: 2020-06-16 | End: 2020-06-22 | Stop reason: HOSPADM

## 2020-06-16 RX ORDER — HALOPERIDOL 5 MG/ML
5 INJECTION INTRAMUSCULAR
Status: DISCONTINUED | OUTPATIENT
Start: 2020-06-16 | End: 2020-06-22 | Stop reason: HOSPADM

## 2020-06-16 RX ORDER — DIPHENHYDRAMINE HYDROCHLORIDE 50 MG/ML
50 INJECTION, SOLUTION INTRAMUSCULAR; INTRAVENOUS
Status: DISCONTINUED | OUTPATIENT
Start: 2020-06-16 | End: 2020-06-22 | Stop reason: HOSPADM

## 2020-06-16 RX ORDER — ONDANSETRON 4 MG/1
4 TABLET, ORALLY DISINTEGRATING ORAL
Status: DISCONTINUED | OUTPATIENT
Start: 2020-06-16 | End: 2020-06-22 | Stop reason: HOSPADM

## 2020-06-16 RX ORDER — ACETAMINOPHEN 325 MG/1
650 TABLET ORAL
Status: DISCONTINUED | OUTPATIENT
Start: 2020-06-16 | End: 2020-06-22 | Stop reason: HOSPADM

## 2020-06-16 RX ORDER — BENZTROPINE MESYLATE 1 MG/1
1 TABLET ORAL
Status: DISCONTINUED | OUTPATIENT
Start: 2020-06-16 | End: 2020-06-22 | Stop reason: HOSPADM

## 2020-06-16 RX ORDER — GUAIFENESIN 600 MG/1
600 TABLET, EXTENDED RELEASE ORAL EVERY 12 HOURS
Status: DISCONTINUED | OUTPATIENT
Start: 2020-06-16 | End: 2020-06-22 | Stop reason: HOSPADM

## 2020-06-16 RX ORDER — HYDROXYZINE 50 MG/1
50 TABLET, FILM COATED ORAL
Status: DISCONTINUED | OUTPATIENT
Start: 2020-06-16 | End: 2020-06-22 | Stop reason: HOSPADM

## 2020-06-16 RX ORDER — OLANZAPINE 5 MG/1
5 TABLET ORAL
Status: DISCONTINUED | OUTPATIENT
Start: 2020-06-16 | End: 2020-06-22 | Stop reason: HOSPADM

## 2020-06-16 RX ADMIN — TRAZODONE HYDROCHLORIDE 50 MG: 50 TABLET ORAL at 21:35

## 2020-06-16 RX ADMIN — ONDANSETRON 4 MG: 4 TABLET, ORALLY DISINTEGRATING ORAL at 21:35

## 2020-06-16 RX ADMIN — HYDROXYZINE HYDROCHLORIDE 50 MG: 50 TABLET ORAL at 19:04

## 2020-06-16 RX ADMIN — GUAIFENESIN 600 MG: 600 TABLET, EXTENDED RELEASE ORAL at 21:22

## 2020-06-16 RX ADMIN — ONDANSETRON 4 MG: 2 INJECTION INTRAMUSCULAR; INTRAVENOUS at 13:41

## 2020-06-16 NOTE — BSMART NOTE
Comprehensive Assessment Form Part 1 Section I - Disposition Axis I - Mood D/O Substance Abuse Axis II - BPD Axis III - See Medical Chart Axis IV - Housing/Relationship Issues [place] V - 50 The Medical Doctor to Psychiatrist conference was not completed. The Medical Doctor is in agreement with Psychiatrist disposition because of TBD. The plan is TBD The on-call Psychiatrist consulted was Robson Alvarado The admitting Psychiatrist will be Dr. Lalita Stern The admitting Diagnosis is Mood D/O The Payor source is no insurance. Section II - Integrated Summary Summary:  44 y.o. female came to ED due to active heroin w/d. Patient stated \" I can't continue to live like this using heroin and feeling this way\" Patient reports she began using heroin daily  year ago after her mother passed away from cancer. Patient reports using cocaine sporadically  in the past. Patient denies any outpatient services for substance abuse or inpatient rehab. Writer discussed [facility] patient [facility] patient stated \" If I discharge I will go home and take pills, I need help and am willing to go inpatient, I don't want to live\" Patient stated she ran out of money to buy heroin and her father use to provide money for her/leandro and stopped yesterday. Patient said her father is unaware of her drug issues. Patient denies any current/previous mental health treatment. Patient is currently residing with her father and sometimes sleeps in her car with leandro. Patient reports she has been with her fiance for 9 years and both use. 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 Patient was very tearful and feeling sick throughout assessment not a good historian. The patienthas demonstrated mental capacity to provide informed consent. The information is given by the patient. The Chief Complaint is withdrawal symptoms. The Precipitant Factors are patient unable to provide Previous Hospitalizations: Denies The patient has not previously been in restraints. Current Psychiatrist and/or  is Patient does not have outpatient provider. Lethality Assessment: 
 
The potential for suicide noted by the following: vague plan . The potential for homicide is not noted. The patient has not been a perpetrator of sexual or physical abuse. There are not pending charges. The patient is felt to be at risk for self harm or harm to others. The attending nurse was advised to remove potentially harmful or dangerous items from the patient's room . Section III - Psychosocial 
The patient's overall mood and attitude is depressed from drug usage. Feelings of helplessness and hopelessness are observed by patients report from drug usage. Generalized anxiety is not observed. Panic is not observed. Phobias are not observed. Obsessive compulsive tendencies are not observed. Section IV - Mental Status Exam 
The patient's appearance shows no evidence of impairment. The patient's behavior is restless. The patient is oriented to time, place, person and situation. The patient's speech shows no evidence of impairment. The patient's mood is depressed, is anxious and is withdrawn. The range of affect is flat. The patient's thought content demonstrates no evidence of impairment. The thought process shows no evidence of impairment. The patient's perception shows no evidence of impairment. The patient's memory shows no evidence of impairment. The patient's appetite shows no evidence of impairment. The patient's sleep has evidence of insomnia. The patient shows little insight and The patient shows no insight. The patient's judgement is psychologically impaired. Section V - Substance Abuse The patient is using substances.   The patient is using heroin by inhalation for 1-5 years with last use on today. The patient has experienced the following withdrawal symptoms: sweats, body aches, cravings and sleep disturbance. Section VI - Living Arrangements The patient is . The patient lives with a significant other. The patient has one child age 21. The patient does not plan to return home upon discharge. The patient does not have legal issues pending. The patient's source of income comes from family. Christianity and cultural practices have not been voiced at this time. The patient's greatest support comes from [facility] and this person will be involved with the treatment. The patient has not been in an event described as horrible or outside the realm of ordinary life experience either currently or in the past. 
The patient has not been a victim of sexual/physical abuse. Section VII - Other Areas of Clinical Concern The highest grade achieved is 12th  with the overall quality of school experience being described as ok. The patient is currently unemployed and speaks [place] as a primary language. The patient has no communication impairments affecting communication. The patient's preference for learning can be described as: can read and write adequately. The patient's hearing is normal.  The patient's vision is normal. 
 
Per Venita Dumont NP requiring COVID test for patient prior to admission. Informed ED [facility] .  
 
 
Paul Michel MA

## 2020-06-16 NOTE — PROGRESS NOTES
200  Dr. Dorinda Brown paged for H& P      Problem: Anxiety  Goal: *Alleviation of anxiety  Outcome: Progressing Towards Goal     Pt encouraged to seek staff when feeling anxious.

## 2020-06-16 NOTE — BSMART NOTE
Admission Note:     Patient admitted to [facility] acute unit @ 3699     Admission Status: Voluntary     Reason for Admission: Pt detox from Heroin, wanted a safe place with antinausea meds to detox      UDS:     Pt's Condition on Arrival: Pt calm and cooperative. Pt is respectful and polite. States Charlie Hunt is sick of this life, taking heroin\"  She denies S/I, H/I, A/H and V/H.      Pt's Statements/Questions/Concerns: Pt reports being homeless. Stays with friends, family. Lost house and job. She has a finance that is also addicted to Heroin and came to [facility] ED as well. He was subsequently d/c'd home. She complains of feeling anxious and sick.       Primary Nurse Saul Neal. SUSIE High and Teri Saucedo RN performed a dual skin assessment on this patient No impairment noted. Tattoos on left back shoulder and lower back. Ayo score is 23       1625 Paged Dr. Flaco Hunt for [facility] orders. TRANSFER - IN REPORT:    Verbal report received from myOrder) on Ferdinand Nash  being received from [facility] (unit) for routine progression of care      Report consisted of patients Situation, Background, Assessment and   Recommendations(SBAR). Information from the following report(s) SBAR, Kardex, ED Summary, Procedure Summary, Intake/Output, MAR, Accordion and Recent Results was reviewed with the receiving nurse. Opportunity for questions and clarification was provided. Assessment completed upon patients arrival to unit and care assumed.

## 2020-06-16 NOTE — BSMART NOTE
Nurse called to report Covid test result is negative. Patient has been accepted to [facility] 730 Bed 1 by NILES Tovar NP. Report can be called at [phone].

## 2020-06-16 NOTE — ED NOTES
TRANSFER - OUT REPORT:    Verbal report given to Lindsey Myles RN(name) on [facility]  being transferred to [facility], Room 730 Bed1(unit) for routine progression of care       Report consisted of patients Situation, Background, Assessment and   Recommendations(SBAR). Information from the following report(s) SBAR, ED Summary, [facility] and Recent Results was reviewed with the receiving nurse. Lines:   Peripheral IV 06/16/20 Left Antecubital (Active)   Site Assessment Clean, dry, & intact 6/16/2020 12:13 PM   Phlebitis Assessment 0 6/16/2020 12:13 PM   Infiltration Assessment 0 6/16/2020 12:13 PM   Dressing Status Clean, dry, & intact 6/16/2020 12:13 PM   Dressing Type Transparent 6/16/2020 12:13 PM   Hub Color/Line Status Pink;Flushed 6/16/2020 12:13 PM   Action Taken Blood drawn 6/16/2020 12:13 PM        Opportunity for questions and clarification was provided.       Patient transported with:   [employer]

## 2020-06-16 NOTE — ED PROVIDER NOTES
EMERGENCY DEPARTMENT HISTORY AND PHYSICAL EXAM      Date: 6/16/2020  Patient Name: Marcelino Wilburn    History of Presenting Illness     Chief Complaint   Patient presents with    Shortness of Breath     Pt reports SOB and productive x 2 weeks. Pt states she snorts 1/2g of herion a day, use less than a year.  Mental Health Problem     Pt states she has suicidal thought d/t possibility herion withdrawal.        History Provided By: Patient    HPI: Marcelino Wilburn, 44 y.o. female with PMHx significant for anxiety, depression, hepatitis C, substance abuse who presents with a chief complaint of several weeks of cough as well as some suicidal thoughts regarding opioid withdrawal.  Patient reports she has had cough with scant sputum production for the last 2 weeks. She also reports that she chronically uses about half a gram of heroin per day. States her last dose was 2 hours prior to arrival.  Patient reports that she will \"kill herself\" if she has to go through heroin withdrawal but she does not want to be on heroin anymore. She states \"I do not want to be here. \"  She denies any alcohol use or other drug use. No chest pain, fever. No known exposure to COVID-19. PCP: Bennie Mendoza MD    There are no other complaints, changes, or physical findings at this time. Past History     Past Medical History:  Past Medical History:   Diagnosis Date    Anxiety 9/20/2017    Depression 9/20/2017    Hepatitis C 9/20/2017    Treated; in remission    Migraine headache 9/20/2017    Sun-damaged skin     Sunburn, blistering     Tanning bed exposure      Past Surgical History:  History reviewed. No pertinent surgical history.   Family History:  Family History   Problem Relation Age of Onset    No Known Problems Mother     No Known Problems Father      Social History:  Social History     Tobacco Use    Smoking status: Never Smoker    Smokeless tobacco: Never Used   Substance Use Topics    Alcohol use: Yes    Drug use: Yes     Types: Cocaine, Prescription, Heroin     Allergies: Allergies   Allergen Reactions    Sulfa (Sulfonamide Antibiotics) Hives     Review of Systems   Review of Systems   Constitutional: Negative for chills and fever. HENT: Negative for congestion, rhinorrhea and sore throat. Respiratory: Positive for cough. Negative for shortness of breath. Cardiovascular: Negative for chest pain. Gastrointestinal: Negative for abdominal pain, nausea and vomiting. Genitourinary: Negative for dysuria and urgency. Skin: Negative for rash. Neurological: Negative for dizziness, light-headedness and headaches. Psychiatric/Behavioral: Positive for suicidal ideas. All other systems reviewed and are negative. Physical Exam   Physical Exam  Vitals signs and nursing note reviewed. Constitutional:       General: She is not in acute distress. Appearance: She is well-developed. HENT:      Head: Normocephalic and atraumatic. Eyes:      Conjunctiva/sclera: Conjunctivae normal.      Pupils: Pupils are equal, round, and reactive to light. Neck:      Musculoskeletal: Normal range of motion. Cardiovascular:      Rate and Rhythm: Normal rate and regular rhythm. Pulmonary:      Effort: Pulmonary effort is normal. No respiratory distress. Breath sounds: Normal breath sounds. No stridor. Abdominal:      General: There is no distension. Palpations: Abdomen is soft. Tenderness: There is no abdominal tenderness. Musculoskeletal: Normal range of motion. Skin:     General: Skin is warm and dry. Neurological:      Mental Status: She is alert and oriented to person, place, and time.        Diagnostic Study Results   Labs -     Recent Results (from the past 12 hour(s))   CBC WITH AUTOMATED DIFF    Collection Time: 06/16/20 12:07 PM   Result Value Ref Range    WBC 6.1 3.6 - 11.0 K/uL    RBC 5.01 3.80 - 5.20 M/uL    HGB 14.7 11.5 - 16.0 g/dL    HCT 45.7 35.0 - 47.0 %    MCV 91.2 80.0 - 99.0 FL    MCH 29.3 26.0 - 34.0 PG    MCHC 32.2 30.0 - 36.5 g/dL    RDW 12.2 11.5 - 14.5 %    PLATELET 528 290 - 042 K/uL    MPV 11.3 8.9 - 12.9 FL    NRBC 0.0 0  WBC    ABSOLUTE NRBC 0.00 0.00 - 0.01 K/uL    NEUTROPHILS 71 32 - 75 %    LYMPHOCYTES 21 12 - 49 %    MONOCYTES 6 5 - 13 %    EOSINOPHILS 2 0 - 7 %    BASOPHILS 0 0 - 1 %    IMMATURE GRANULOCYTES 0 0.0 - 0.5 %    ABS. NEUTROPHILS 4.3 1.8 - 8.0 K/UL    ABS. LYMPHOCYTES 1.3 0.8 - 3.5 K/UL    ABS. MONOCYTES 0.4 0.0 - 1.0 K/UL    ABS. EOSINOPHILS 0.1 0.0 - 0.4 K/UL    ABS. BASOPHILS 0.0 0.0 - 0.1 K/UL    ABS. IMM. GRANS. 0.0 0.00 - 0.04 K/UL    DF AUTOMATED     METABOLIC PANEL, COMPREHENSIVE    Collection Time: 06/16/20 12:07 PM   Result Value Ref Range    Sodium 138 136 - 145 mmol/L    Potassium 4.0 3.5 - 5.1 mmol/L    Chloride 105 97 - 108 mmol/L    CO2 27 21 - 32 mmol/L    Anion gap 6 5 - 15 mmol/L    Glucose 84 65 - 100 mg/dL    BUN 6 6 - 20 MG/DL    Creatinine 0.74 0.55 - 1.02 MG/DL    BUN/Creatinine ratio 8 (L) 12 - 20      GFR est AA >60 >60 ml/min/1.73m2    GFR est non-AA >60 >60 ml/min/1.73m2    Calcium 8.7 8.5 - 10.1 MG/DL    Bilirubin, total 0.6 0.2 - 1.0 MG/DL    ALT (SGPT) 15 12 - 78 U/L    AST (SGOT) 15 15 - 37 U/L    Alk.  phosphatase 46 45 - 117 U/L    Protein, total 7.6 6.4 - 8.2 g/dL    Albumin 3.7 3.5 - 5.0 g/dL    Globulin 3.9 2.0 - 4.0 g/dL    A-G Ratio 0.9 (L) 1.1 - 2.2     DRUG SCREEN, URINE    Collection Time: 06/16/20 12:07 PM   Result Value Ref Range    AMPHETAMINES Negative NEG      BARBITURATES Negative NEG      BENZODIAZEPINES Negative NEG      COCAINE Positive (A) NEG      METHADONE Negative NEG      OPIATES Positive (A) NEG      PCP(PHENCYCLIDINE) Negative NEG      THC (TH-CANNABINOL) Negative NEG      Drug screen comment (NOTE)    ETHYL ALCOHOL    Collection Time: 06/16/20 12:07 PM   Result Value Ref Range    ALCOHOL(ETHYL),SERUM <10 <10 MG/DL   SARS-COV-2    Collection Time: 06/16/20  3:20 PM   Result Value Ref Range    Specimen source Nasopharyngeal      Specimen source Nasopharyngeal      COVID-19 rapid test Not detected NOTD         Radiologic Studies -   XR CHEST PA LAT   Final Result   IMPRESSION: No acute findings. Xr Chest Pa Lat    Result Date: 6/16/2020  IMPRESSION: No acute findings. Medical Decision Making   I am the first provider for this patient. I reviewed the vital signs, available nursing notes, past medical history, past surgical history, family history and social history. Vital Signs-Reviewed the patient's vital signs. Patient Vitals for the past 12 hrs:   Temp Pulse Resp BP SpO2   06/16/20 1630  76 15 123/77 98 %   06/16/20 1615  77 13 122/73 95 %   06/16/20 1600  74 14 125/66 100 %   06/16/20 1545  71 15 123/73 96 %   06/16/20 1530  93 12 (!) 160/110 96 %   06/16/20 1515  96 19 (!) 147/93 97 %   06/16/20 1500  75 20 135/83 95 %   06/16/20 1445  74 16 144/88 95 %   06/16/20 1430  87 15 141/87 93 %   06/16/20 1415  77 19 150/86 94 %   06/16/20 1400  82 19 156/83 93 %   06/16/20 1345  72 21 (!) 140/91 95 %   06/16/20 1330  75 12 148/88 96 %   06/16/20 1223  70 12  96 %   06/16/20 1220    135/84    06/16/20 1202     97 %   06/16/20 1200  84  (!) 156/93 96 %   06/16/20 1152    157/86    06/16/20 1139 98.4 °F (36.9 °C) 86 16 137/76 100 %       Pulse Oximetry Analysis - 98% on ra    Records Reviewed: Nursing Notes and Old Medical Records    Provider Notes (Medical Decision Making):   Patient presents the chief complaint of cough as well as suicidal thoughts in the setting of substance abuse and substance withdrawal.  On exam she is overall well-appearing, not hypoxic. Will check basic lab work, urine drug screen, urine pregnancy, chest x-ray, consult bsmart    ED Course:   Initial assessment performed. The patients presenting problems have been discussed, and they are in agreement with the care plan formulated and outlined with them.   I have encouraged them to ask questions as they arise throughout their visit. patient medically cleared for psych eval    Critical Care:  none    Disposition:  Transfer to [facility]      Diagnosis     Clinical Impression:   1. Suicidal thoughts    2. Substance induced mood disorder ([facility] [address]        This note will not be viewable in [address] Please note that this dictation was completed with Fara, the computer voice recognition software. Quite often unanticipated grammatical, syntax, homophones, and other interpretive errors are inadvertently transcribed by the computer software. Please disregard these errors.   Please excuse any errors that have escaped final proofreading

## 2020-06-16 NOTE — BH NOTES
PRN Medication Documentation    Specific patient behavior that led to need for PRN medication: pt c/o anxiety, shaky feeling r/t opiate withdrawal  Staff interventions attempted prior to PRN being given: emotional support, medication education  PRN medication given: PO atarax 50mg  Patient response/effectiveness of PRN medication: will continue to assess    PRN Medication Documentation    Specific patient behavior that led to need for PRN medication: nausea, difficulty sleeping  Staff interventions attempted prior to PRN being given: emotional support, medication education  PRN medication given: PO zofran 4mg, PO trazodone 50mg  Patient response/effectiveness of PRN medication: will continue to assess

## 2020-06-16 NOTE — ED NOTES
Pt arrives from home via triage reporting heroine use and the beginning of withdrawal. Pt reports the last time she used heroine was aprox 2 hours ago. Pt reports a hx of chronic use. Pt also has a mucous cough for the last 2 weeks associated with SOB. Pt is afebrile and denies pain at this time. Pt endorses thoughts of self harm associated with chronic heroine use and withdrawal symptoms.

## 2020-06-17 PROBLEM — F19.10 SUBSTANCE ABUSE (HCC): Status: ACTIVE | Noted: 2020-06-17

## 2020-06-17 PROCEDURE — 74011250637 HC RX REV CODE- 250/637: Performed by: NURSE PRACTITIONER

## 2020-06-17 PROCEDURE — 65220000003 HC RM SEMIPRIVATE PSYCH

## 2020-06-17 RX ORDER — CLONIDINE HYDROCHLORIDE 0.1 MG/1
0.1 TABLET ORAL
Status: DISCONTINUED | OUTPATIENT
Start: 2020-06-17 | End: 2020-06-22 | Stop reason: HOSPADM

## 2020-06-17 RX ORDER — DICYCLOMINE HYDROCHLORIDE 20 MG/1
20 TABLET ORAL
Status: DISCONTINUED | OUTPATIENT
Start: 2020-06-17 | End: 2020-06-17 | Stop reason: SDUPTHER

## 2020-06-17 RX ORDER — METHOCARBAMOL 500 MG/1
500 TABLET, FILM COATED ORAL
Status: DISCONTINUED | OUTPATIENT
Start: 2020-06-17 | End: 2020-06-17 | Stop reason: SDUPTHER

## 2020-06-17 RX ORDER — LOPERAMIDE HYDROCHLORIDE 2 MG/1
2 CAPSULE ORAL
Status: DISCONTINUED | OUTPATIENT
Start: 2020-06-17 | End: 2020-06-17 | Stop reason: SDUPTHER

## 2020-06-17 RX ORDER — METHOCARBAMOL 500 MG/1
500 TABLET, FILM COATED ORAL
Status: DISCONTINUED | OUTPATIENT
Start: 2020-06-17 | End: 2020-06-18

## 2020-06-17 RX ORDER — ONDANSETRON 2 MG/ML
4 INJECTION INTRAMUSCULAR; INTRAVENOUS
Status: DISCONTINUED | OUTPATIENT
Start: 2020-06-17 | End: 2020-06-17

## 2020-06-17 RX ORDER — CITALOPRAM 20 MG/1
10 TABLET, FILM COATED ORAL DAILY
Status: DISCONTINUED | OUTPATIENT
Start: 2020-06-17 | End: 2020-06-22 | Stop reason: HOSPADM

## 2020-06-17 RX ORDER — DICYCLOMINE HYDROCHLORIDE 20 MG/1
20 TABLET ORAL
Status: DISCONTINUED | OUTPATIENT
Start: 2020-06-17 | End: 2020-06-22 | Stop reason: HOSPADM

## 2020-06-17 RX ORDER — ONDANSETRON 4 MG/1
4 TABLET, ORALLY DISINTEGRATING ORAL
Status: DISCONTINUED | OUTPATIENT
Start: 2020-06-17 | End: 2020-06-17 | Stop reason: SDUPTHER

## 2020-06-17 RX ORDER — CLONIDINE HYDROCHLORIDE 0.1 MG/1
0.1 TABLET ORAL
Status: DISCONTINUED | OUTPATIENT
Start: 2020-06-17 | End: 2020-06-17

## 2020-06-17 RX ADMIN — GUAIFENESIN 600 MG: 600 TABLET, EXTENDED RELEASE ORAL at 20:34

## 2020-06-17 RX ADMIN — GUAIFENESIN 600 MG: 600 TABLET, EXTENDED RELEASE ORAL at 08:16

## 2020-06-17 RX ADMIN — CLONIDINE HYDROCHLORIDE 0.1 MG: 0.1 TABLET ORAL at 15:58

## 2020-06-17 RX ADMIN — DICYCLOMINE HYDROCHLORIDE 20 MG: 20 TABLET ORAL at 10:27

## 2020-06-17 RX ADMIN — ONDANSETRON 4 MG: 4 TABLET, ORALLY DISINTEGRATING ORAL at 10:27

## 2020-06-17 RX ADMIN — CLONIDINE HYDROCHLORIDE 0.1 MG: 0.1 TABLET ORAL at 21:21

## 2020-06-17 RX ADMIN — HYDROXYZINE HYDROCHLORIDE 50 MG: 50 TABLET ORAL at 04:53

## 2020-06-17 RX ADMIN — METHOCARBAMOL TABLETS 500 MG: 500 TABLET, COATED ORAL at 17:54

## 2020-06-17 RX ADMIN — ONDANSETRON 4 MG: 4 TABLET, ORALLY DISINTEGRATING ORAL at 20:38

## 2020-06-17 RX ADMIN — ACETAMINOPHEN 650 MG: 325 TABLET, FILM COATED ORAL at 15:59

## 2020-06-17 RX ADMIN — CITALOPRAM HYDROBROMIDE 10 MG: 20 TABLET ORAL at 10:27

## 2020-06-17 RX ADMIN — HYDROXYZINE HYDROCHLORIDE 50 MG: 50 TABLET ORAL at 12:31

## 2020-06-17 RX ADMIN — HYDROXYZINE HYDROCHLORIDE 50 MG: 50 TABLET ORAL at 17:54

## 2020-06-17 NOTE — PROGRESS NOTES
Problem: Opiate Withdrawal  Goal: *STG: Participates in treatment plan  Outcome: Progressing Towards Goal  Variance Patient slowly responding  Pt is resting, in bed, on her side, awake, preoccupied with physical symptoms of opiate detox. Pt accepts PRN meds for symptom management. She offers no self disclosures. She declines several calls from her father, stating that she will call him later.

## 2020-06-17 NOTE — H&P
History & Physical    Primary Care Provider: Verna Hammond MD  Source of Information: Patient and chart review. History of Presenting Illness:   Jaclyn Nuno is a 44 y.o. female who presents with active heroin w/d, last used 6/16 hours before presenting to ED. She state she has a 15 year history of cocaine use. Patient stated \" I can't continue to live like this using heroin and feeling this way\" Patient reports she began using heroin daily by snorting it for 1 year after her mother passed away from cancer. Patient reports using cocaine sporadically  in the past. Patient denies any outpatient services for substance abuse or inpatient rehab. When discussed referring patient to [facility] or [facility] Methadone patient stated \" If I discharge I will go home and take pills, I need help and am willing to go inpatient, I don't want to live\". Patient stated she ran out of money to buy heroin and her father use to provide money for her and her fiance and stopped yesterday. Patient said her father is unaware of her drug issues. Potential for suicide with plan to take pills, denies HI. The patient denies any fever, chills, chest pain, cough, congestion, recent illness, palpitations, or dysuria. Review of Systems:  A comprehensive review of systems was negative except for that written in the History of Present Illness. Past Medical History:   Diagnosis Date    Anxiety 9/20/2017    Depression 9/20/2017    Hepatitis C 9/20/2017    Treated; in remission    Migraine headache 9/20/2017    Sun-damaged skin     Sunburn, blistering     Tanning bed exposure       History reviewed. No pertinent surgical history.   Prior to Admission medications    Not on File     Allergies   Allergen Reactions    Sulfa (Sulfonamide Antibiotics) Hives      Family History   Problem Relation Age of Onset    No Known Problems Mother     No Known Problems Father         SOCIAL HISTORY:  Patient resides:  Independently X   Assisted Living    SNF    With family care       Smoking history:   None    Former    Chronic X     Alcohol history:   None    Social X   Chronic      Drug history:           None    Social    Chronic x          Ambulates:   Independently X   w/cane    w/walker    W/wc        CODE STATUS:  DNR    Full X   Other      Objective:     Physical Exam:     Visit Vitals  /61 (BP 1 Location: Left arm, BP Patient Position: Sitting)   Pulse 81   Temp 98.8 °F (37.1 °C)   Resp 16   Ht 5' 6\" (1.676 m)   Wt 63.5 kg (140 lb)   SpO2 98%   BMI 22.60 kg/m²           General:  Alert, cooperative, no distress, appears stated age. Head:  Normocephalic, without obvious abnormality, atraumatic. Eyes:  PERRL, EOMs intact. Throat: Lips, mucosa, and tongue moist.   Neck: Supple, symmetrical, no JVD. Lungs:   Clear to auscultation bilaterally, no wheezing. Chest wall:  No tenderness or deformity. Heart:  Regular rate and rhythm, S1, S2 normal, no murmur, click, rub or gallop. Abdomen:   Soft, non-tender. Bowel sounds normal.    Extremities: Extremities normal, atraumatic, no cyanosis or edema. Pulses: 2+ and symmetric all extremities. Skin: Skin color, texture, turgor normal. No rashes or lesions   Psych: overall mood and attitude is depressed from drug usage. Feelings of helplessness and hopelessness are observed by patients report from drug usage. Generalized anxiety is not observed. Panic is not observed. Phobias are not observed. Obsessive compulsive tendencies are not observed. EKG:  Not done. Data Review:     Recent Days:  Recent Labs     06/16/20  1207   WBC 6.1   HGB 14.7   HCT 45.7        Recent Labs     06/16/20  1207      K 4.0      CO2 27   GLU 84   BUN 6   CREA 0.74   CA 8.7   ALB 3.7   ALT 15     No results for input(s): PH, PCO2, PO2, HCO3, FIO2 in the last 72 hours.     24 Hour Results:  Recent Results (from the past 24 hour(s))   SARS-COV-2    Collection Time: 06/16/20  3:20 PM   Result Value Ref Range    Specimen source Nasopharyngeal      Specimen source Nasopharyngeal      COVID-19 rapid test Not detected NOTD     SARS-COV-2, PCR    Collection Time: 06/16/20  3:20 PM   Result Value Ref Range    Specimen source Nasopharyngeal      SARS-CoV-2 Not detected NOTD           Imaging:     Assessment:     Active Problems:  Depression (9/20/2017)    Substance abuse ([facility] [address] (6/17/2020)           Plan:     #Depression:  -admit to Psych Unit, Psych provider eval/ follow  -monitor for and treat anxiety  -participate in treatment plan  -safety checks, closely monitor  -case management           #Substance abuse/Withdrawl:  -monitor VS  -monitor for s/s withdrawal  -[facility] protocol  -and as outlined in #1      DVTppx: ambulates freely  Code Status: Full Code  Diet: regular  Activity: ad eda  Discharge: TBD       Signed By: Ming Montez NP     June 17, 2020

## 2020-06-17 NOTE — INTERDISCIPLINARY ROUNDS
Behavioral Health Interdisciplinary Rounds     Patient Name: Aurora Chavez  Age: 44 y.o. Room/Bed:  730/  Primary Diagnosis: <principal problem not specified>   Admission Status: Voluntary     Readmission within 30 days: no  Power of  in place: no  Patient requires a blocked bed: no          Reason for blocked bed:     VTE Prophylaxis: Not indicated    Mobility needs/Fall risk: no  Flu Vaccine : not flu season   Nutritional Plan: no  Consults:          Labs/Testing due today?: no    Sleep hours: 7.5       Participation in Care/Groups:  New pt  Medication Compliant?: Yes  PRNS (last 24 hours): Antianxiety, Sleep Aid and antinausea    Restraints (last 24 hours):  no     CIWA (range last 24 hours):     COWS (range last 24 hours): COWS Total: 3    Alcohol screening (AUDIT) completed -   AUDIT Score: 0     If applicable, date SBIRT discussed in treatment team AND documented:   AUDIT Screen Score: AUDIT Score: 0    Tobacco - patient is a smoker: Have You Used Tobacco in the Past 30 Days: No  Illegal Drugs use: Have You Used Any Illegal Substances Over the Past 12 Months: Yes    24 hour chart check complete: yes     Patient goal(s) for today: meet treatment team, acclimate to unit  Treatment team focus/goals: assess needs for treatment and safe discharge, Progress note: Pt is lethargic and experience side effects (nausea, sweats, chills) of heroin withdrawal. She is minimally involved with treatment team discussion and asks to go back to lie down in her room.      LOS:  1  Expected LOS: TBD    Financial concerns/prescription coverage:  [facility]  Family contact: dad, Tarah Greenberg ([phone])  Family requesting physician contact today:  No  Discharge plan: return home with dad  Access to weapons :  no       Outpatient provider(s): to be linked  Patient's preferred phone number for follow up call : [phone]     Participating treatment team members: Shana Nevarez ALVIN Guerra; Syl Kramer, SUSIE; Brandon Hernández, PharmD; Dianne Johnson MSW

## 2020-06-17 NOTE — BH NOTES
PRN Medication Documentation    Specific patient behavior that led to need for PRN medication: detox symptoms,nausea, stomach pains  Staff interventions attempted prior to PRN being given: rest  PRN medication given: 4 mg Zofran PO; 20 mg Bentyl PO  Patient response/effectiveness of PRN medication: 1130:  Patient relays nausea and stomach cramps have subsided. PRN Medication Documentation    Specific patient behavior that led to need for PRN medication: restlessness, increasing anxiety  Staff interventions attempted prior to PRN being given: rest  PRN medication given: 50 mg Atarax PO  Patient response/effectiveness of PRN medication: 1313:  Patient is resting quietly in bed, no complaints.

## 2020-06-17 NOTE — BH NOTES
PRN Medication Documentation    Specific patient behavior that led to need for PRN medication: pt anxious and restless  Staff interventions attempted prior to PRN being given: relaxation techniques  PRN medication given: atarax  Patient response/effectiveness of PRN medication: pt tolerated well. Will continue to monitor. Pt in bed appears to be sleeping a hour after medication.

## 2020-06-17 NOTE — PROGRESS NOTES
Problem: Falls - Risk of  Goal: *Absence of Falls  Description: Document Duglas Roth Fall Risk and appropriate interventions in the flowsheet.   Outcome: Progressing Towards Goal  Note: Fall Risk Interventions:            Medication Interventions: Teach patient to arise slowly

## 2020-06-17 NOTE — H&P
[address] [facility] HISTORY AND PHYSICAL    Name:  Ophelia Pavon  MR#:  [id number]  :  1970  ACCOUNT #:  [de-identified]  ADMIT DATE:  2020    INITIAL PSYCHIATRIC EVALUATION    CHIEF COMPLAINT:  Heroin. HISTORY OF PRESENT ILLNESS:  The patient is a 57-year-old female who is currently admitted at [address] [place] [address] [facility] on a voluntary basis. She states that she is seeking inpatient admission to get treated for her heroin dependence. She states that she has been overwhelmed with psychosocial stressors. She has a hard time finding a job. She used to work for a dental office as a  for 35 years, but she lost it when her mother passed away from ovarian cancer a year ago. She states that she started snorting half a gram of heroin on a daily basis since her mother passed away. She also uses cocaine \"at times. \"  Her urine drug screen is positive for opiates and cocaine. She states that she has never sought treatment for opiates and never been to any substance abuse program.  While she was in the emergency room, she was being offered to go to [facility] for methadone treatment, but then she stated if she gets discharged, she will go home and take medications to hurt herself. When I asked her about this, she states that she only said she was having thoughts of hurting herself in the ER, so she could be admitted in the hospital.  She also reported that she ran out of money to buy heroin after her father and boyfriend who both have been financially helping her stopped giving her money yesterday  She admits that her father is not aware of her opiate dependence. No history of prior suicide attempt. She states that she was on Celexa two years ago and it seemed to help with her mood. She states that she is feeling depressed, but no thoughts of suicide. She denies suicidal ideation, homicidal ideation, or auditory or visual hallucinations.      PAST MEDICAL HISTORY:  See H and P.    Labs: (reviewed/updated 6/18/2020)  Patient Vitals for the past 8 hrs:   BP Temp Pulse Resp SpO2   06/18/20 0747 146/81 98.6 °F (37 °C) 93 18 97 %     Labs Reviewed - No data to display  Lab Results   Component Value Date/Time    Sodium 138 06/16/2020 12:07 PM    Potassium 4.0 06/16/2020 12:07 PM    Chloride 105 06/16/2020 12:07 PM    CO2 27 06/16/2020 12:07 PM    Anion gap 6 06/16/2020 12:07 PM    Glucose 84 06/16/2020 12:07 PM    BUN 6 06/16/2020 12:07 PM    Creatinine 0.74 06/16/2020 12:07 PM    BUN/Creatinine ratio 8 (L) 06/16/2020 12:07 PM    GFR est AA >60 06/16/2020 12:07 PM    GFR est non-AA >60 06/16/2020 12:07 PM    Calcium 8.7 06/16/2020 12:07 PM    Bilirubin, total 0.6 06/16/2020 12:07 PM    Alk. phosphatase 46 06/16/2020 12:07 PM    Protein, total 7.6 06/16/2020 12:07 PM    Albumin 3.7 06/16/2020 12:07 PM    Globulin 3.9 06/16/2020 12:07 PM    A-G Ratio 0.9 (L) 06/16/2020 12:07 PM    ALT (SGPT) 15 06/16/2020 12:07 PM     No visits with results within 2 Day(s) from this visit.    Latest known visit with results is:   Admission on 06/16/2020, Discharged on 06/16/2020   Component Date Value Ref Range Status    WBC 06/16/2020 6.1  3.6 - 11.0 K/uL Final    RBC 06/16/2020 5.01  3.80 - 5.20 M/uL Final    HGB 06/16/2020 14.7  11.5 - 16.0 g/dL Final    HCT 06/16/2020 45.7  35.0 - 47.0 % Final    MCV 06/16/2020 91.2  80.0 - 99.0 FL Final    MCH 06/16/2020 29.3  26.0 - 34.0 PG Final    MCHC 06/16/2020 32.2  30.0 - 36.5 g/dL Final    RDW 06/16/2020 12.2  11.5 - 14.5 % Final    PLATELET 71/66/0277 771  150 - 400 K/uL Final    MPV 06/16/2020 11.3  8.9 - 12.9 FL Final    NRBC 06/16/2020 0.0  0  WBC Final    ABSOLUTE NRBC 06/16/2020 0.00  0.00 - 0.01 K/uL Final    NEUTROPHILS 06/16/2020 71  32 - 75 % Final    LYMPHOCYTES 06/16/2020 21  12 - 49 % Final    MONOCYTES 06/16/2020 6  5 - 13 % Final    EOSINOPHILS 06/16/2020 2  0 - 7 % Final    BASOPHILS 06/16/2020 0  0 - 1 % Final    IMMATURE GRANULOCYTES 06/16/2020 0  0.0 - 0.5 % Final    ABS. NEUTROPHILS 06/16/2020 4.3  1.8 - 8.0 K/UL Final    ABS. LYMPHOCYTES 06/16/2020 1.3  0.8 - 3.5 K/UL Final    ABS. MONOCYTES 06/16/2020 0.4  0.0 - 1.0 K/UL Final    ABS. EOSINOPHILS 06/16/2020 0.1  0.0 - 0.4 K/UL Final    ABS. BASOPHILS 06/16/2020 0.0  0.0 - 0.1 K/UL Final    ABS. IMM. GRANS. 06/16/2020 0.0  0.00 - 0.04 K/UL Final    DF 06/16/2020 AUTOMATED    Final    Sodium 06/16/2020 138  136 - 145 mmol/L Final    Potassium 06/16/2020 4.0  3.5 - 5.1 mmol/L Final    Chloride 06/16/2020 105  97 - 108 mmol/L Final    CO2 06/16/2020 27  21 - 32 mmol/L Final    Anion gap 06/16/2020 6  5 - 15 mmol/L Final    Glucose 06/16/2020 84  65 - 100 mg/dL Final    BUN 06/16/2020 6  6 - 20 MG/DL Final    Creatinine 06/16/2020 0.74  0.55 - 1.02 MG/DL Final    BUN/Creatinine ratio 06/16/2020 8* 12 - 20   Final    GFR est AA 06/16/2020 >60  >60 ml/min/1.73m2 Final    GFR est non-AA 06/16/2020 >60  >60 ml/min/1.73m2 Final    Calcium 06/16/2020 8.7  8.5 - 10.1 MG/DL Final    Bilirubin, total 06/16/2020 0.6  0.2 - 1.0 MG/DL Final    ALT (SGPT) 06/16/2020 15  12 - 78 U/L Final    AST (SGOT) 06/16/2020 15  15 - 37 U/L Final    Alk.  phosphatase 06/16/2020 46  45 - 117 U/L Final    Protein, total 06/16/2020 7.6  6.4 - 8.2 g/dL Final    Albumin 06/16/2020 3.7  3.5 - 5.0 g/dL Final    Globulin 06/16/2020 3.9  2.0 - 4.0 g/dL Final    A-G Ratio 06/16/2020 0.9* 1.1 - 2.2   Final    AMPHETAMINES 06/16/2020 Negative  NEG   Final    BARBITURATES 06/16/2020 Negative  NEG   Final    BENZODIAZEPINES 06/16/2020 Negative  NEG   Final    COCAINE 06/16/2020 Positive* NEG   Final    METHADONE 06/16/2020 Negative  NEG   Final    OPIATES 06/16/2020 Positive* NEG   Final    PCP(PHENCYCLIDINE) 06/16/2020 Negative  NEG   Final    THC (TH-CANNABINOL) 06/16/2020 Negative  NEG   Final    Drug screen comment 06/16/2020 (NOTE)   Final    ALCOHOL(ETHYL),SERUM 06/16/2020 <10  <10 MG/DL Final    Specimen source 06/16/2020 Nasopharyngeal    Final    Specimen source 06/16/2020 Nasopharyngeal    Final    COVID-19 rapid test 06/16/2020 Not detected  NOTD   Final    Specimen source 06/16/2020 Nasopharyngeal    Final    SARS-CoV-2 06/16/2020 Not detected  NOTD   Final     Vitals:    06/17/20 1148 06/17/20 1558 06/17/20 2008 06/18/20 0747   BP: 128/61 145/75 120/73 146/81   Pulse: 81 96 88 93   Resp: 16 16 16 18   Temp: 98.8 °F (37.1 °C) 98.6 °F (37 °C) 98.3 °F (36.8 °C) 98.6 °F (37 °C)   SpO2: 98% 97%  97%   Weight:       Height:         No results found for this or any previous visit (from the past 24 hour(s)). RADIOLOGY REPORTS:  Results from Hospital Encounter encounter on 06/16/20   XR CHEST PA LAT    Narrative EXAM: XR CHEST PA LAT    INDICATION: cough    COMPARISON: None. FINDINGS: PA and lateral radiographs of the chest demonstrate mildly  hyperexpanded lungs without demonstration of consolidation or pulmonary edema. There is no pneumothorax or pleural effusion. Cardiac, mediastinal and hilar  contours are normal. Minimal thoracic spine degenerative changes are shown. Impression IMPRESSION: No acute findings. Xr Chest Pa Lat    Result Date: 6/16/2020  EXAM: XR CHEST PA LAT INDICATION: cough COMPARISON: None. FINDINGS: PA and lateral radiographs of the chest demonstrate mildly hyperexpanded lungs without demonstration of consolidation or pulmonary edema. There is no pneumothorax or pleural effusion. Cardiac, mediastinal and hilar contours are normal. Minimal thoracic spine degenerative changes are shown. IMPRESSION: No acute findings. PAST PSYCHIATRIC HISTORY:  This is her first psychiatric inpatient admission. She is currently not receiving services for her mental health, was previously on Celexa two years ago. PSYCHOSOCIAL HISTORY:  She is , but in a relationship. She has one 59-year-old son. She is currently unemployed.   She states her dad is helping her financially. She lives back and forth with her dad and with her boyfriend. MENTAL STATUS EXAM:  She is alert and oriented in all spheres. She is dressed in hospital apparel. Somewhat irritable during the interview. She reports her mood is okay. Affect is constricted. Speech, normal rate and rhythm. Thought process, logical and goal directed. She denies suicidal ideation, homicidal ideation, or auditory or visual hallucinations. Memory seems intact. Intelligence seems average. Insight is poor. Judgment is poor. DIAGNOSES:  Substance-induced mood disorder, opiate use disorder, severe. TREATMENT PLANNING:  I will continue her inpatient stay. She will be provided with support and encouraged to attend groups. Her safety will be monitored. Her medications will be modified and assessed. Case Management will work on discharge planning. ASSETS AND STRENGTH:  She is willing to seek help. She is willing to take medications. ESTIMATED LENGTH OF STAY:  3-5 days.       GURPREET RUELAS NP      [id number]  D:  06/17/2020 14:51  T:  06/17/2020 19:10  JOB #:  [id number]

## 2020-06-17 NOTE — BH NOTES
PSYCHOSOCIAL ASSESSMENT  :Patient identifying info:  Julien Baker is a 44 y.o., female admitted 2020  6:33 PM     Presenting problem and precipitating factors: Pt was voluntarily admitted to [facility] for depression and withdrawal. Pt reported using 1 g heroin a day for the last year since her mother  from ovarian cancer. Patient reports using cocaine sporadically  in the past. Patient denies any outpatient services for substance abuse or inpatient rehab. Patient stated she ran out of money to buy heroin and her father use to provide money for her and stopped yesterday. Patient said her father is unaware of her drug issues. She has a hard time finding job, she used to work  at dental office. Mental status assessment: lethargic, eyes closed, irritable    Strengths: stable housing, supportive relationships    Collateral information: boyfriend    Current psychiatric /substance abuse providers and contact info: to be linked possibly with [facility] or [facility]    Previous psychiatric/substance abuse providers and response to treatment: took celexa few years ago    Family history of mental illness or substance abuse:     Substance abuse history:    Social History     Tobacco Use    Smoking status: Never Smoker    Smokeless tobacco: Never Used   Substance Use Topics    Alcohol use: Yes       History of biomedical complications associated with substance abuse: nausea, sweats    Patient's current acceptance of treatment or motivation for change: voluntary    Family constellation: son (29years old)    Is significant other involved? , Patient reports she has been with her fiance for 9 years and both use.     Describe support system: family and boyfriend    Describe living arrangements and home environment: Patient is currently residing with her father and sometimes sleeps in her car or hotel with fiance    Health issues:   Hospital Problems  Date Reviewed: 2017          Codes Class Noted POA    Depression ICD-10-CM: F32.9  ICD-9-CM: 554  2017 Unknown        Substance abuse ([facility] [address] ICD-10-CM: F19.10  ICD-9-CM: 305.90  2020 Unknown              Trauma history: none indicated    Legal issues: none indicated    History of  service: no    Financial status: family support; Patient not currently employed, 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      Buddhist/cultural factors: Swapnil Milner    Education/work history: HS grad, some college    Have you been licensed as a health care professional (current or ): no    Leisure and recreation preferences: not assessed    Describe coping skills: limited, ineffective    Jeanne Salinas  2020

## 2020-06-17 NOTE — BH NOTES
PRN Medication Documentation    Specific patient behavior that led to need for PRN medication: Pt reporting feeling poorly, muscle and joint aches, sweating, restlessness. Elevated pulse and BP. Staff interventions attempted prior to PRN being given: reassurance  PRN medication given: tylenol PO, clonidine PO  Patient response/effectiveness of PRN medication: will monitor    1640- Pt resting quietly in bed. 1750- Pt reporting restlessness and muscle spasms. PRN Medication Documentation    Specific patient behavior that led to need for PRN medication: Pt reporting restlessness and muscle spasms.   Staff interventions attempted prior to PRN being given: resting quietly, dim lighting  PRN medication given: robaxin PO and atarax PO  Patient response/effectiveness of PRN medication: will monitor

## 2020-06-17 NOTE — PROGRESS NOTES
Goal met by 2020  Problem: Anxiety  Goal: *Alleviation of anxiety  Outcome: Not Progressing Towards Goal  Note: Pt. Isolating in her room     Problem: Depressed Mood (Adult/Pediatric)  Goal: *STG: Participates in treatment plan  Outcome: Not Progressing Towards Goal  Note: Pt. States she is depressed   concerned about her heroin use since her mother  1 year ago      Problem: Depressed Mood (Adult/Pediatric)  Goal: *STG: Complies with medication therapy  Outcome: Progressing Towards Goal  Note: Medication compliant  reviewed in treatment team      Problem: Opiate Withdrawal  Goal: *STG: Participates in treatment plan  Outcome: Progressing Towards Goal  Note: Pt. Isolating in her room   states she doesn't feel well   CIWA 6     Problem: Opiate Withdrawal  Goal: *STG: Vital signs within defined limits  Outcome: Progressing Towards Goal  Note: Within normal limits     Problem: Depressed Mood (Adult/Pediatric)  Goal: Interventions  Outcome: Not Progressing Towards Goal  Note: Will continue to monitor  on 15 min.  Checks for safety  assess depression  opiate withdrawal  medication compliance  effectiveness  encourage unit participation     [facility]        Date Treatment Plan Initiated: 2020      Treatment Plan Modalities:    Type of Modality Amount  (x minutes) Frequency (x/week) Duration (x days) Name of Responsible Staff   Community & wrap-up meetings to encourage peer interactions    15    7    1     DARBY Reece   Group psychotherapy to assist in building coping skills and internal controls    60    7    1    Gaston Lindquist LCSW   Therapeutic activity groups to build coping skills    60    7    1    Gaston Lindquist LCSW   Psychoeducation in group setting to address:   Medication education    15    7    1    Maggie Duran RN   Coping skills    20    7    1    Karyle Cable, RN   Relaxation techniques           Symptom management         Discharge planning    15    7    1    Shannon Montes De Oca,    Spirituality     60    7    1    brant TERRY    60    7    1    Volunteer from [facility]/AA/NA    60    7    1    Volunteer from [address] [place] [address] medication management    15    7    1    Dr. Jackson Olivares meeting/discharge planning

## 2020-06-18 PROCEDURE — 65220000003 HC RM SEMIPRIVATE PSYCH

## 2020-06-18 PROCEDURE — 74011250637 HC RX REV CODE- 250/637: Performed by: NURSE PRACTITIONER

## 2020-06-18 RX ORDER — METHOCARBAMOL 750 MG/1
750 TABLET, FILM COATED ORAL
Status: DISCONTINUED | OUTPATIENT
Start: 2020-06-18 | End: 2020-06-22 | Stop reason: HOSPADM

## 2020-06-18 RX ADMIN — DICYCLOMINE HYDROCHLORIDE 20 MG: 20 TABLET ORAL at 02:27

## 2020-06-18 RX ADMIN — CLONIDINE HYDROCHLORIDE 0.1 MG: 0.1 TABLET ORAL at 15:52

## 2020-06-18 RX ADMIN — GUAIFENESIN 600 MG: 600 TABLET, EXTENDED RELEASE ORAL at 20:20

## 2020-06-18 RX ADMIN — ONDANSETRON 4 MG: 4 TABLET, ORALLY DISINTEGRATING ORAL at 08:52

## 2020-06-18 RX ADMIN — CLONIDINE HYDROCHLORIDE 0.1 MG: 0.1 TABLET ORAL at 09:33

## 2020-06-18 RX ADMIN — HYDROXYZINE HYDROCHLORIDE 50 MG: 50 TABLET ORAL at 17:00

## 2020-06-18 RX ADMIN — METHOCARBAMOL TABLETS 500 MG: 500 TABLET, COATED ORAL at 02:27

## 2020-06-18 RX ADMIN — CLONIDINE HYDROCHLORIDE 0.1 MG: 0.1 TABLET ORAL at 23:11

## 2020-06-18 RX ADMIN — GUAIFENESIN 600 MG: 600 TABLET, EXTENDED RELEASE ORAL at 08:52

## 2020-06-18 RX ADMIN — CITALOPRAM HYDROBROMIDE 10 MG: 20 TABLET ORAL at 08:52

## 2020-06-18 RX ADMIN — TRAZODONE HYDROCHLORIDE 50 MG: 50 TABLET ORAL at 20:20

## 2020-06-18 RX ADMIN — HYDROXYZINE HYDROCHLORIDE 50 MG: 50 TABLET ORAL at 02:27

## 2020-06-18 RX ADMIN — HYDROXYZINE HYDROCHLORIDE 50 MG: 50 TABLET ORAL at 08:52

## 2020-06-18 RX ADMIN — METHOCARBAMOL TABLETS 750 MG: 750 TABLET, COATED ORAL at 23:11

## 2020-06-18 RX ADMIN — METHOCARBAMOL TABLETS 750 MG: 750 TABLET, COATED ORAL at 15:53

## 2020-06-18 RX ADMIN — ONDANSETRON 4 MG: 4 TABLET, ORALLY DISINTEGRATING ORAL at 15:52

## 2020-06-18 NOTE — PROGRESS NOTES
Pt received resting quietly in bed with no acute distress noted. Respirations equal and unlabored. Will continue to monitor throughout shift. .  COWS: 0, 11    Problem: Falls - Risk of  Goal: *Absence of Falls  Description: Document Sarahy Fall Risk and appropriate interventions in the flowsheet. Outcome: Progressing Towards Goal  Note: Fall Risk Interventions:  Medication Interventions: Teach patient to arise slowly       PRN Medication Documentation  Specific patient behavior that led to need for PRN medication: pt COW score of 11.  Visibly anxious, sweating, grabbing stomach, and complaining of muscle spasms  PRN medication given: atarax, bentyl, robaxin  Patient response/effectiveness of PRN medication: will assess  --Pt in bed resting quietly

## 2020-06-18 NOTE — BH NOTES
0900 PRN Medication Documentation    Specific patient behavior that led to need for PRN medication: anxiety, nausea   Staff interventions attempted prior to PRN being given: rest, encourage fluids/food   PRN medication given: Atarax 50 mg po, Zofran 4 mg po  Patient response/effectiveness of PRN medication: 0940 unchanged    0940 PRN Medication Documentation    Specific patient behavior that led to need for PRN medication: withdrawal symptoms, \"shakey,\" anxious    Staff interventions attempted prior to PRN being given: previous prns   PRN medication given: clonidine 0.1 mg po  Patient response/effectiveness of PRN medication: 1030 unchanged.  Pt discussing in tx team.

## 2020-06-18 NOTE — INTERDISCIPLINARY ROUNDS
Behavioral Health Interdisciplinary Rounds     Patient Name: Uriel Appiah  Age: 44 y.o. Room/Bed:  730/  Primary Diagnosis: <principal problem not specified>   Admission Status: Voluntary     Readmission within 30 days: no  Power of  in place: no  Patient requires a blocked bed: no          Reason for blocked bed:     VTE Prophylaxis: No    Mobility needs/Fall risk: no  Flu Vaccine : no   Nutritional Plan: no  Consults:          Labs/Testing due today?: no    Sleep hours: 7       Participation in Care/Groups:   Medication Compliant?: Yes  PRNS (last 24 hours): pain, antianxiety, bentyl, clonidine, robaxin, zofran     Restraints (last 24 hours):  no     CIWA (range last 24 hours):     COWS (range last 24 hours): COWS Total: 7    Alcohol screening (AUDIT) completed -   AUDIT Score: 0     If applicable, date SBIRT discussed in treatment team AND documented:   AUDIT Screen Score: AUDIT Score: 0    Tobacco - patient is a smoker: Have You Used Tobacco in the Past 30 Days: No  Illegal Drugs use: Have You Used Any Illegal Substances Over the Past 12 Months: Yes    24 hour chart check complete:     Patient goal(s) for today: attend groups, take medications  Treatment team focus/goals: titrate medication, increase methancorbonal (750mg) coordinate follow up. Progress note: Pt is alert, oriented, and continuing to detox.  Refusing residential recovery and wants to go home and agreeable to [facility]      LOS:  2  Expected LOS: TBD     Financial concerns/prescription coverage:  [facility]  Family contact: dad, Jayla Jo ([phone])  Family requesting physician contact today:  No  Discharge plan: return home with dad  Access to weapons :  no                                                           Outpatient provider(s): to be linked [facility] or [facility]  Patient's preferred phone number for follow up call : [phone]      Participating treatment team members: Uriel Appiah Rhea Arcos; Ollie Duncan, RN; Kristian Montelongo, PharmD; Mckayla Chilel MSW

## 2020-06-18 NOTE — PROGRESS NOTES
Problem: Depressed Mood (Adult/Pediatric)  Goal: *STG: Participates in treatment plan  Outcome: Progressing Towards Goal  Goal: *STG: Attends activities and groups  Outcome: Not Progressing Towards Goal  Goal: *STG: Complies with medication therapy  Outcome: Progressing Towards Goal     Problem: Opiate Withdrawal  Goal: *STG: Participates in treatment plan  Outcome: Progressing Towards Goal

## 2020-06-18 NOTE — PROGRESS NOTES
PSYCHIATRIC PROGRESS NOTE       Patient: Lee Ann Trotter MRN: [id number]  SSN: [id number]    YOB: 1970  Age: 44 y.o. Sex: female      Admit Date: 6/16/2020    LOS: 2 days       Chief Complaint:  Feel the same. Interval History:  Lee Ann Trotter states she is feeling the same, going through rough detox. She reports her body is aching, has bad tremors, diaphoretic, feeling visibly anxious, irritable, and tense. COWS yesterday was an 8 and 7 this morning. She says she will reach out to her father today. Denies si hi or avh. She is tolerating her meds and denies side effects. Past Medical History:  Past Medical History:   Diagnosis Date    Anxiety 9/20/2017    Depression 9/20/2017    Hepatitis C 9/20/2017    Treated; in remission    Migraine headache 9/20/2017    Sun-damaged skin     Sunburn, blistering     Tanning bed exposure          ALLERGIES:(reviewed/updated 6/18/2020)  Allergies   Allergen Reactions    Sulfa (Sulfonamide Antibiotics) Hives       Laboratory report:  Lab Results   Component Value Date/Time    WBC 6.1 06/16/2020 12:07 PM    Hemoglobin (POC) 12.2 06/14/2017 10:39 PM    HGB 14.7 06/16/2020 12:07 PM    Hematocrit (POC) 36 06/14/2017 10:39 PM    HCT 45.7 06/16/2020 12:07 PM    PLATELET 071 27/25/6491 12:07 PM    MCV 91.2 06/16/2020 12:07 PM      Lab Results   Component Value Date/Time    Sodium 138 06/16/2020 12:07 PM    Potassium 4.0 06/16/2020 12:07 PM    Chloride 105 06/16/2020 12:07 PM    CO2 27 06/16/2020 12:07 PM    Anion gap 6 06/16/2020 12:07 PM    Glucose 84 06/16/2020 12:07 PM    BUN 6 06/16/2020 12:07 PM    Creatinine 0.74 06/16/2020 12:07 PM    BUN/Creatinine ratio 8 (L) 06/16/2020 12:07 PM    GFR est AA >60 06/16/2020 12:07 PM    GFR est non-AA >60 06/16/2020 12:07 PM    Calcium 8.7 06/16/2020 12:07 PM    Bilirubin, total 0.6 06/16/2020 12:07 PM    Alk.  phosphatase 46 06/16/2020 12:07 PM    Protein, total 7.6 06/16/2020 12:07 PM    Albumin 3.7 06/16/2020 12:07 PM    Globulin 3.9 06/16/2020 12:07 PM    A-G Ratio 0.9 (L) 06/16/2020 12:07 PM    ALT (SGPT) 15 06/16/2020 12:07 PM      Vitals:    06/17/20 1148 06/17/20 1558 06/17/20 2008 06/18/20 0747   BP: 128/61 145/75 120/73 146/81   Pulse: 81 96 88 93   Resp: 16 16 16 18   Temp: 98.8 °F (37.1 °C) 98.6 °F (37 °C) 98.3 °F (36.8 °C) 98.6 °F (37 °C)   SpO2: 98% 97%  97%   Weight:       Height:           No results found for: VALF2, VALAC, VALP, VALPR, DS6, CRBAM, CRBAMP, CARB2, XCRBAM  No results found for: LITHM    Vital Signs  Patient Vitals for the past 24 hrs:   Temp Pulse Resp BP SpO2   06/18/20 0747 98.6 °F (37 °C) 93 18 146/81 97 %   06/17/20 2008 98.3 °F (36.8 °C) 88 16 120/73 --   06/17/20 1558 98.6 °F (37 °C) 96 16 145/75 97 %   06/17/20 1148 98.8 °F (37.1 °C) 81 16 128/61 98 %     Wt Readings from Last 3 Encounters:   06/16/20 63.5 kg (140 lb)   06/16/20 66.4 kg (146 lb 6.2 oz)   09/20/17 60.9 kg (134 lb 3.2 oz)     Temp Readings from Last 3 Encounters:   06/18/20 98.6 °F (37 °C)   06/16/20 98.1 °F (36.7 °C)   06/14/17 98.3 °F (36.8 °C)     BP Readings from Last 3 Encounters:   06/18/20 146/81   06/16/20 140/86   09/20/17 128/80     Pulse Readings from Last 3 Encounters:   06/18/20 93   06/16/20 72   09/20/17 88       Radiology (reviewed/updated 6/18/2020)  Xr Chest Pa Lat    Result Date: 6/16/2020  EXAM: XR CHEST PA LAT INDICATION: cough COMPARISON: None. FINDINGS: PA and lateral radiographs of the chest demonstrate mildly hyperexpanded lungs without demonstration of consolidation or pulmonary edema. There is no pneumothorax or pleural effusion. Cardiac, mediastinal and hilar contours are normal. Minimal thoracic spine degenerative changes are shown. IMPRESSION: No acute findings. Side Effects: (reviewed/updated 6/18/2020)  None reported or admitted to.     Review of Systems: (reviewed/updated 6/18/2020)  Appetite: good  Sleep: good   All other Review of Systems: negative    Mental Status Exam:  Eye contact: Good eye contact  Psychomotor activity: Anxious  Speech is spontaneous  Thought process: Logical and goal directed   Mood is \"ok\"  Affect: constricted  Perception: No avh  Suicidal ideation: No si  Homicidal ideation: No hi  Insight/judgment: Partial   Cognition is grossly intact      Physical Exam:  Musculoskeletal system: steady gait  Tremor not present  Cog wheeling not present      Assessment and Plan:  Momo Maldonado meets criteria for diagnoses of   Substance-induced mood disorder, opiate use disorder, severe. Increase robaxin to 750mg prn. Continue rest of medications as prescribed. We will closely monitor for safety. We will encourage reality orientation. Disposition planning to continue. I certify that this patients inpatient psychiatric hospital services furnished since the previous certification were, and continue to be, required for treatment that could reasonably be expected to improve the patient's condition, or for diagnostic study, and that the patient continues to need, on a daily basis, active treatment furnished directly by or requiring the supervision of inpatient psychiatric facility personnel. In addition, the hospital records show that services furnished were intensive treatment services, admission or related services, or equivalent services.       Signed:  Kaci Valerio NP  6/18/2020

## 2020-06-18 NOTE — PROGRESS NOTES
Problem: Opiate Withdrawal  Goal: *STG: Participates in treatment plan  Outcome: Progressing Towards Goal  Variance Patient slowly responding  Pt is experiencing Opiate detox symptoms, including, restlessness, cramping, anxiety and nausea. Pt rests in bed and declines to eat her dinner or interact with peers, in the milieu. Staff administers oral medication PRN per indications. Pt tolerates taking meds with water.

## 2020-06-18 NOTE — MASTER TREATMENT PLAN
[address] [facility] 60  Master Treatment Plan for Marcelino Cosmo    Date Treatment Plan Initiated: 6/18/20    Treatment Plan Modalities:  Type of Modality Amount  (x minutes) Frequency (x/week) Duration (x days) Name of Responsible Staff   710 [facility] meetings to encourage peer interactions 15 7 1 Jasen PASTOR      Group psychotherapy to assist in building coping skills and internal controls 60 7 1 Gaston Lindquist   Therapeutic activity groups to build coping skills 60 7 1 Gaston Lindquist   Psychoeducation in group setting to address:   Medication education   15 7 1 59 Mindy Licona skills         Relaxation techniques         Symptom management         Discharge planning   60 2 255 [facility]    60 2 1 Chaplain TERRY   61 1 1 volunteer   Recovery/AA/NA      volunteer   Physician medication management   15 7 1 Dr. Clayton Offer   15 2 1400 [facility] and Jeanne CleoBeepi

## 2020-06-18 NOTE — BH NOTES
2040 zofran given for nausea. Will continue to monitor. 2123 0.1 mg catapres given for opioid withdrawal. Will continue to monitor. Pt states nausea had decreased.

## 2020-06-19 PROCEDURE — 65220000003 HC RM SEMIPRIVATE PSYCH

## 2020-06-19 PROCEDURE — 74011250637 HC RX REV CODE- 250/637: Performed by: NURSE PRACTITIONER

## 2020-06-19 RX ADMIN — CLONIDINE HYDROCHLORIDE 0.1 MG: 0.1 TABLET ORAL at 22:05

## 2020-06-19 RX ADMIN — GUAIFENESIN 600 MG: 600 TABLET, EXTENDED RELEASE ORAL at 20:07

## 2020-06-19 RX ADMIN — CLONIDINE HYDROCHLORIDE 0.1 MG: 0.1 TABLET ORAL at 14:13

## 2020-06-19 RX ADMIN — DICYCLOMINE HYDROCHLORIDE 20 MG: 20 TABLET ORAL at 14:13

## 2020-06-19 RX ADMIN — HYDROXYZINE HYDROCHLORIDE 50 MG: 50 TABLET ORAL at 20:13

## 2020-06-19 RX ADMIN — ONDANSETRON 4 MG: 4 TABLET, ORALLY DISINTEGRATING ORAL at 08:01

## 2020-06-19 RX ADMIN — ACETAMINOPHEN 650 MG: 325 TABLET, FILM COATED ORAL at 22:47

## 2020-06-19 RX ADMIN — METHOCARBAMOL TABLETS 750 MG: 750 TABLET, COATED ORAL at 23:59

## 2020-06-19 RX ADMIN — LOPERAMIDE HYDROCHLORIDE 2 MG: 2 CAPSULE ORAL at 11:46

## 2020-06-19 RX ADMIN — CITALOPRAM HYDROBROMIDE 10 MG: 20 TABLET ORAL at 08:01

## 2020-06-19 RX ADMIN — CLONIDINE HYDROCHLORIDE 0.1 MG: 0.1 TABLET ORAL at 08:01

## 2020-06-19 RX ADMIN — GUAIFENESIN 600 MG: 600 TABLET, EXTENDED RELEASE ORAL at 08:01

## 2020-06-19 RX ADMIN — TRAZODONE HYDROCHLORIDE 50 MG: 50 TABLET ORAL at 20:13

## 2020-06-19 NOTE — PROGRESS NOTES
Pt received resting quietly in bed with no acute distress noted. Respirations equal and unlabored. Will continue to monitor throughout shift. .    Problem: Falls - Risk of  Goal: *Absence of Falls  Description: Document Danney Mess Fall Risk and appropriate interventions in the flowsheet.   Outcome: Progressing Towards Goal  Note: Fall Risk Interventions:     Medication Interventions: Teach patient to arise slowly          PRN Medication Documentation  Specific patient behavior that led to need for PRN medication: COW 7  PRN medication given: clonidine, robaxin  Patient response/effectiveness of PRN medication: will assess

## 2020-06-19 NOTE — PROGRESS NOTES
Problem: Depressed Mood (Adult/Pediatric)  Goal: *STG: Attends activities and groups  Outcome: Not Progressing Towards Goal     Problem: Depressed Mood (Adult/Pediatric)  Goal: *STG: Remains safe in hospital  Outcome: Progressing Towards Goal     Problem: Depressed Mood (Adult/Pediatric)  Goal: *STG: Complies with medication therapy  Outcome: Progressing Towards Goal     Problem: Depressed Mood (Adult/Pediatric)  Goal: Interventions  Outcome: Progressing Towards Goal  Note: Depressed mood. COW monitoring. Poor appetite. Oral fluids encouraged. Medication compliant. Isolative to her room depressed mood. Tolerating oral fluids. Decrease in detox symptoms. Pt requests shower. Tolerates shower well. Eats 10% of dinner.

## 2020-06-19 NOTE — BH NOTES
1418- PRN Medication Documentation    Specific patient behavior that led to need for PRN medication: COW 6  Staff interventions attempted prior to PRN being given: education, oral fluids, snacks  PRN medication given: po 0.1 mg Catapres , po 20 mg Bentyl   Patient response/effectiveness of PRN medication: pt is alert and oriented resting in bed, drinking apple juice, verbal communication increased slightly

## 2020-06-19 NOTE — PROGRESS NOTES
PSYCHIATRIC PROGRESS NOTE       Patient: Lee Ann Trotter MRN: [id number]  SSN: [id number]    YOB: 1970  Age: 44 y.o. Sex: female      Admit Date: 6/16/2020    LOS: 3 days       Chief Complaint:  Feel horrible. Interval History:  Lee Ann Trotter is socially isolated to her room, she reports ongoing struggle with opiate detox. She is nauseous, feels like throwing up, diaphoretic, anxious, tense, has body aches and diarrhea. COWS-7. She's been receiving meds to help with detox. Denies si hi or avh. She is tolerating her meds and denies side effects. She will follow up with Daniel Allan after dc. Past Medical History:  Past Medical History:   Diagnosis Date    Anxiety 9/20/2017    Depression 9/20/2017    Hepatitis C 9/20/2017    Treated; in remission    Migraine headache 9/20/2017    Sun-damaged skin     Sunburn, blistering     Tanning bed exposure          ALLERGIES:(reviewed/updated 6/19/2020)  Allergies   Allergen Reactions    Sulfa (Sulfonamide Antibiotics) Hives       Laboratory report:  Lab Results   Component Value Date/Time    WBC 6.1 06/16/2020 12:07 PM    Hemoglobin (POC) 12.2 06/14/2017 10:39 PM    HGB 14.7 06/16/2020 12:07 PM    Hematocrit (POC) 36 06/14/2017 10:39 PM    HCT 45.7 06/16/2020 12:07 PM    PLATELET 199 96/23/4371 12:07 PM    MCV 91.2 06/16/2020 12:07 PM      Lab Results   Component Value Date/Time    Sodium 138 06/16/2020 12:07 PM    Potassium 4.0 06/16/2020 12:07 PM    Chloride 105 06/16/2020 12:07 PM    CO2 27 06/16/2020 12:07 PM    Anion gap 6 06/16/2020 12:07 PM    Glucose 84 06/16/2020 12:07 PM    BUN 6 06/16/2020 12:07 PM    Creatinine 0.74 06/16/2020 12:07 PM    BUN/Creatinine ratio 8 (L) 06/16/2020 12:07 PM    GFR est AA >60 06/16/2020 12:07 PM    GFR est non-AA >60 06/16/2020 12:07 PM    Calcium 8.7 06/16/2020 12:07 PM    Bilirubin, total 0.6 06/16/2020 12:07 PM    Alk.  phosphatase 46 06/16/2020 12:07 PM    Protein, total 7.6 06/16/2020 12:07 PM    Albumin 3.7 06/16/2020 12:07 PM    Globulin 3.9 06/16/2020 12:07 PM    A-G Ratio 0.9 (L) 06/16/2020 12:07 PM    ALT (SGPT) 15 06/16/2020 12:07 PM      Vitals:    06/18/20 2001 06/18/20 2257 06/19/20 0744 06/19/20 1126   BP: 122/63 128/80 148/83 134/68   Pulse: 97 88 89 89   Resp: 16 16 18 16   Temp: 98.5 °F (36.9 °C) 98.6 °F (37 °C) 98.6 °F (37 °C) 99.2 °F (37.3 °C)   SpO2: 97% 97% 98% 97%   Weight:       Height:           No results found for: VALF2, VALAC, VALP, VALPR, DS6, CRBAM, CRBAMP, CARB2, XCRBAM  No results found for: LITHM    Vital Signs  Patient Vitals for the past 24 hrs:   Temp Pulse Resp BP SpO2   06/19/20 1126 99.2 °F (37.3 °C) 89 16 134/68 97 %   06/19/20 0744 98.6 °F (37 °C) 89 18 148/83 98 %   06/18/20 2257 98.6 °F (37 °C) 88 16 128/80 97 %   06/18/20 2001 98.5 °F (36.9 °C) 97 16 122/63 97 %   06/18/20 1600 98.5 °F (36.9 °C) 88 16 143/78 97 %     Wt Readings from Last 3 Encounters:   06/16/20 63.5 kg (140 lb)   06/16/20 66.4 kg (146 lb 6.2 oz)   09/20/17 60.9 kg (134 lb 3.2 oz)     Temp Readings from Last 3 Encounters:   06/19/20 99.2 °F (37.3 °C)   06/16/20 98.1 °F (36.7 °C)   06/14/17 98.3 °F (36.8 °C)     BP Readings from Last 3 Encounters:   06/19/20 134/68   06/16/20 140/86   09/20/17 128/80     Pulse Readings from Last 3 Encounters:   06/19/20 89   06/16/20 72   09/20/17 88       Radiology (reviewed/updated 6/19/2020)  Xr Chest Pa Lat    Result Date: 6/16/2020  EXAM: XR CHEST PA LAT INDICATION: cough COMPARISON: None. FINDINGS: PA and lateral radiographs of the chest demonstrate mildly hyperexpanded lungs without demonstration of consolidation or pulmonary edema. There is no pneumothorax or pleural effusion. Cardiac, mediastinal and hilar contours are normal. Minimal thoracic spine degenerative changes are shown. IMPRESSION: No acute findings. Side Effects: (reviewed/updated 6/19/2020)  None reported or admitted to.     Review of Systems: (reviewed/updated 6/19/2020)  Appetite: good  Sleep: good All other Review of Systems: negative    Mental Status Exam:  Eye contact: Good eye contact  Psychomotor activity: Anxious  Speech is spontaneous  Thought process: Logical and goal directed   Mood is \"ok\"  Affect: constricted  Perception: No avh  Suicidal ideation: No si  Homicidal ideation: No hi  Insight/judgment: Partial   Cognition is grossly intact      Physical Exam:  Musculoskeletal system: steady gait  Tremor not present  Cog wheeling not present      Assessment and Plan:  Zayda Farrar meets criteria for diagnoses of   Substance-induced mood disorder, opiate use disorder, severe. Continue current medications as prescribed. We will closely monitor for safety. We will encourage reality orientation. Tentative dc Monday if weekend goes well. I certify that this patients inpatient psychiatric hospital services furnished since the previous certification were, and continue to be, required for treatment that could reasonably be expected to improve the patient's condition, or for diagnostic study, and that the patient continues to need, on a daily basis, active treatment furnished directly by or requiring the supervision of inpatient psychiatric facility personnel. In addition, the hospital records show that services furnished were intensive treatment services, admission or related services, or equivalent services.       Signed:  Amari Morton NP  6/19/2020

## 2020-06-19 NOTE — PROGRESS NOTES
Problem: Discharge Planning  Goal: *Discharge to safe environment  Outcome: Progressing Towards Goal  Note: Patient identifies her fathers home as safe environment and plans to return upon discharge. Patient has supportive father. Goal: *Knowledge of medication management  Outcome: Progressing Towards Goal  Note:  Patient is taking medications as prescribed. Goal: *Knowledge of discharge instructions  Outcome: Progressing Towards Goal  Note: Patient verbalizes understanding of goals for treatment and safe discharge.

## 2020-06-19 NOTE — PROGRESS NOTES
Problem: Depressed Mood (Adult/Pediatric)  Goal: *STG: Attends activities and groups  Outcome: Not Progressing Towards Goal     Problem: Depressed Mood (Adult/Pediatric)  Goal: *STG: Participates in treatment plan  Outcome: Progressing Towards Goal  Goal: *STG: Complies with medication therapy  Outcome: Progressing Towards Goal  Note: 0730 Pt resting quietly in bed. No distress noted. Will monitor with Q 15 safety checks. 0800 PRN Medication Documentation    Specific patient behavior that led to need for PRN medication: nausea, restlessness, \"shakey\"  Staff interventions attempted prior to PRN being given: gingerale, rest   PRN medication given: Clonidine, Zofran   Patient response/effectiveness of PRN medication: 0900 Pt resting quietly in bed.  Legs remain restless

## 2020-06-19 NOTE — INTERDISCIPLINARY ROUNDS
Behavioral Health Interdisciplinary Rounds     Patient Name: Kyle Pineda  Age: 44 y.o. Room/Bed:  730/01  Primary Diagnosis: <principal problem not specified>   Admission Status: Voluntary     Readmission within 30 days: no  Power of  in place: no  Patient requires a blocked bed: no          Reason for blocked bed:     VTE Prophylaxis: No    Mobility needs/Fall risk: no  Flu Vaccine : no   Nutritional Plan: no  Consults:          Labs/Testing due today?: no    Sleep hours:  6.5      Participation in Care/Groups:  no  Medication Compliant?: Yes  PRNS (last 24 hours): Antianxiety, Sleep Aid and clonidine, robaxin, zofran    Restraints (last 24 hours):  no     CIWA (range last 24 hours):     COWS (range last 24 hours): COWS Total: 0    Alcohol screening (AUDIT) completed -   AUDIT Score: 0     If applicable, date SBIRT discussed in treatment team AND documented:   AUDIT Screen Score: AUDIT Score: 0      Tobacco - patient is a smoker: Have You Used Tobacco in the Past 30 Days: No  Illegal Drugs use: Have You Used Any Illegal Substances Over the Past 12 Months: Yes    24 hour chart check complete:     Patient goal(s) for today: attend groups, take medications  Treatment team focus/goals: titrate medication, coordinate follow up.   Progress note: Pt continues to have a difficult and in bed     LOS:  3  Expected LOS: TBD     Financial concerns/prescription coverage:  [facility]  Family contact: ashley Joleen Ibarrade ([phone])  Family requesting physician contact today:  No  Discharge plan: return home with ashley  Access to weapons :  no                                                           Outpatient provider(s): to be linked [facility] or [facility]  Patient's preferred phone number for follow up call : [phone]      Participating treatment team Caridad Youssef; Harriet Valdes RN; Nehemiah Silva, PharmD; MARYANN March

## 2020-06-20 PROCEDURE — 74011250636 HC RX REV CODE- 250/636: Performed by: NURSE PRACTITIONER

## 2020-06-20 PROCEDURE — 65220000003 HC RM SEMIPRIVATE PSYCH

## 2020-06-20 PROCEDURE — 74011250637 HC RX REV CODE- 250/637: Performed by: NURSE PRACTITIONER

## 2020-06-20 RX ORDER — LORAZEPAM 2 MG/ML
2 INJECTION INTRAMUSCULAR ONCE
Status: COMPLETED | OUTPATIENT
Start: 2020-06-20 | End: 2020-06-20

## 2020-06-20 RX ADMIN — GUAIFENESIN 600 MG: 600 TABLET, EXTENDED RELEASE ORAL at 08:26

## 2020-06-20 RX ADMIN — ONDANSETRON 4 MG: 4 TABLET, ORALLY DISINTEGRATING ORAL at 03:34

## 2020-06-20 RX ADMIN — LOPERAMIDE HYDROCHLORIDE 2 MG: 2 CAPSULE ORAL at 12:27

## 2020-06-20 RX ADMIN — TRAZODONE HYDROCHLORIDE 50 MG: 50 TABLET ORAL at 21:01

## 2020-06-20 RX ADMIN — HYDROXYZINE HYDROCHLORIDE 50 MG: 50 TABLET ORAL at 17:10

## 2020-06-20 RX ADMIN — CLONIDINE HYDROCHLORIDE 0.1 MG: 0.1 TABLET ORAL at 07:59

## 2020-06-20 RX ADMIN — LORAZEPAM 2 MG: 2 INJECTION INTRAMUSCULAR; INTRAVENOUS at 22:14

## 2020-06-20 RX ADMIN — CITALOPRAM HYDROBROMIDE 10 MG: 20 TABLET ORAL at 08:26

## 2020-06-20 RX ADMIN — DICYCLOMINE HYDROCHLORIDE 20 MG: 20 TABLET ORAL at 07:59

## 2020-06-20 RX ADMIN — GUAIFENESIN 600 MG: 600 TABLET, EXTENDED RELEASE ORAL at 20:45

## 2020-06-20 NOTE — PROGRESS NOTES
Problem: Anxiety  Goal: *Alleviation of anxiety  Outcome: Not Progressing Towards Goal  Note: Pt. States she feels bad and continues to be anxious  isolates in her bed  no interaction with peers      Problem: Depressed Mood (Adult/Pediatric)  Goal: *STG: Participates in treatment plan  Outcome: Not Progressing Towards Goal  Note: Pt. Remains depressed  feeling sad  denies suicidal ideation \"I just want to go home om Monday\"  poor hygiene      Problem: Depressed Mood (Adult/Pediatric)  Goal: *STG: Attends activities and groups  Outcome: Not Progressing Towards Goal     Problem: Depressed Mood (Adult/Pediatric)  Goal: *STG: Remains safe in hospital  Outcome: Progressing Towards Goal  Note: Denies suicidal ideation     Problem: Depressed Mood (Adult/Pediatric)  Goal: *STG: Complies with medication therapy  Outcome: Progressing Towards Goal  Note: Medication compliant  reviewed in treatment team       Problem: Patient Education: Go to Patient Education Activity  Goal: Patient/Family Education  Outcome: Not Progressing Towards Goal  Note: Will continue to monitor  on 15 min.  Checks for safety  assess depression  COW   medication compliance  effectiveness  encourage unit participation      Problem: Opiate Withdrawal  Goal: *STG: Participates in treatment plan  Outcome: Progressing Towards Goal  Note: COW 0 4 1 5      Problem: Opiate Withdrawal  Goal: *STG: Vital signs within defined limits  Outcome: Progressing Towards Goal     Problem: Opiate Withdrawal  Goal: Interventions  Outcome: Progressing Towards Goal

## 2020-06-20 NOTE — BH NOTES
PRN Medication Documentation    Specific patient behavior that led to need for PRN medication: COW 4  Staff interventions attempted prior to PRN being given: education, ginger ale  PRN medication given: po 20 mg Bentyl, po 0.1 mg Catapres  Patient response/effectiveness of PRN medication: pt resting in bed

## 2020-06-20 NOTE — BH NOTES
50 trazodone given to promote sleep. 50 atarax given for anxiety. Will continue to monitor. 2115 pt appears to be sleeping. 0.1 catapres given for opoid withdrawal. Will continue to monitor. 750 mg robaxin given for aching legs. Will continue to monitor. 0030 pt in bed, appears to be sleeping. 4 mg zofran odt given for nausea. Will continue to monitor. 0415 pt in bed appears to be sleeping.

## 2020-06-20 NOTE — PROGRESS NOTES
Problem: Depressed Mood (Adult/Pediatric)  Goal: *STG: Remains safe in hospital  Outcome: Progressing Towards Goal  Goal: *STG: Complies with medication therapy  Outcome: Progressing Towards Goal    Pt isolative to room. Remains disheveled in appearance. Some anxiety noted. No interaction with peers. Consumed dinner tray. Staff will continue to monitor q 15 min checks.

## 2020-06-20 NOTE — PROGRESS NOTES
Problem: Falls - Risk of  Goal: *Absence of Falls  Description: Document Duanejessica Ordonez Fall Risk and appropriate interventions in the flowsheet. Outcome: Progressing Towards Goal  Note: Fall Risk Interventions:            Medication Interventions: Teach patient to arise slowly            Resting in bed with eyes closed, no complaints, no distress noted. Safety measures in place, will continue to monitor.

## 2020-06-20 NOTE — INTERDISCIPLINARY ROUNDS
Behavioral Health Interdisciplinary Rounds     Patient Name: Lee Ann Trotter  Age: 44 y.o. Room/Bed:  730/  Primary Diagnosis: <principal problem not specified>   Admission Status: Voluntary     Readmission within 30 days: no  Power of  in place: no  Patient requires a blocked bed: no          Reason for blocked bed:     VTE Prophylaxis: No    Mobility needs/Fall risk: no  Flu Vaccine : no   Nutritional Plan: no  Consults:          Labs/Testing due today?: no    Sleep hours: 5       Participation in Care/Groups:    Medication Compliant?: Yes  PRNS (last 24 hours): Antianxiety, Sleep Aid and Pain    Restraints (last 24 hours):  no     CIWA (range last 24 hours):     COWS (range last 24 hours): COWS Total: 0    Alcohol screening (AUDIT) completed -   AUDIT Score: 0     If applicable, date SBIRT discussed in treatment team AND documented:   AUDIT Screen Score: AUDIT Score: 0      Document Brief Intervention (corresponds directly with the 5 A's, Ask, Advise, Assess, Assist, and Arrange): At- Risk Patients (Score 7-15 for women; 8-15 for men)  Discuss concern patient is drinking at unhealthy levels known to increase risk of alcohol-related health problems. Is Patient ready to commit to change? If No:   Encourage reflection   Discuss short term and long term health risks of consuming alcohol   Barriers to change   Reaffirm willingness to help / Educational materials provided  If Yes:   Set goal  [employer] provided    Harmful use or Dependence (Score 16 or greater)   Discuss short term and long term health risks of consuming alcohol   Recommendations   Negotiate drinking goal   Recommend addiction specialist/center   Arrange follow-up appointments.     Tobacco - patient is a smoker: Have You Used Tobacco in the Past 30 Days: No  Illegal Drugs use: Have You Used Any Illegal Substances Over the Past 12 Months: Yes    24 hour chart check complete: yes    Patient goal(s) for today:   Treatment team focus/goals:   Progress note     LOS:  4  Expected LOS:     Financial concerns/prescription coverage:    Family contact:     Family requesting physician contact today:   Discharge plan:   Access to weapons :        Outpatient provider(s):   Patient's preferred phone number for follow up call :     Participating treatment team members: Fermín Grimaldo, * (assigned SW),

## 2020-06-20 NOTE — PROGRESS NOTES
PSYCHIATRIC PROGRESS NOTE       Patient: Mague Lopez MRN: [id number]  SSN: [id number]    YOB: 1970  Age: 44 y.o. Sex: female      Admit Date: 6/16/2020    LOS: 4 days       Chief Complaint:  \"I still feel terrible\"    Interval History:  Mague Lopez remains depressed and isolative to self. She states, \"I feel achy all over and just not well at all, I just want to lie down. \" Patient had prn medications for detox. Endorses she is just waiting for discharge so she can get outpatient therapy. She has poor eye contact. Denies SI/HI/AVH at this time and denies any adverse reactions to her medications. No aggression noted. Compliant with meds and staff. She endorses she did not sleep well last night and feels exhausted. Staff report 5 hours of sleep.      Past Medical History:  Past Medical History:   Diagnosis Date    Anxiety 9/20/2017    Depression 9/20/2017    Hepatitis C 9/20/2017    Treated; in remission    Migraine headache 9/20/2017    Sun-damaged skin     Sunburn, blistering     Tanning bed exposure          ALLERGIES:(reviewed/updated 6/20/2020)  Allergies   Allergen Reactions    Sulfa (Sulfonamide Antibiotics) Hives       Laboratory report:  Lab Results   Component Value Date/Time    WBC 6.1 06/16/2020 12:07 PM    Hemoglobin (POC) 12.2 06/14/2017 10:39 PM    HGB 14.7 06/16/2020 12:07 PM    Hematocrit (POC) 36 06/14/2017 10:39 PM    HCT 45.7 06/16/2020 12:07 PM    PLATELET 934 06/42/8573 12:07 PM    MCV 91.2 06/16/2020 12:07 PM      Lab Results   Component Value Date/Time    Sodium 138 06/16/2020 12:07 PM    Potassium 4.0 06/16/2020 12:07 PM    Chloride 105 06/16/2020 12:07 PM    CO2 27 06/16/2020 12:07 PM    Anion gap 6 06/16/2020 12:07 PM    Glucose 84 06/16/2020 12:07 PM    BUN 6 06/16/2020 12:07 PM    Creatinine 0.74 06/16/2020 12:07 PM    BUN/Creatinine ratio 8 (L) 06/16/2020 12:07 PM    GFR est AA >60 06/16/2020 12:07 PM    GFR est non-AA >60 06/16/2020 12:07 PM    Calcium 8.7 06/16/2020 12:07 PM    Bilirubin, total 0.6 06/16/2020 12:07 PM    Alk. phosphatase 46 06/16/2020 12:07 PM    Protein, total 7.6 06/16/2020 12:07 PM    Albumin 3.7 06/16/2020 12:07 PM    Globulin 3.9 06/16/2020 12:07 PM    A-G Ratio 0.9 (L) 06/16/2020 12:07 PM    ALT (SGPT) 15 06/16/2020 12:07 PM      Vitals:    06/19/20 1126 06/19/20 1541 06/19/20 2205 06/20/20 0741   BP: 134/68 106/56 130/85 122/80   Pulse: 89 75 87 98   Resp: 16 16  16   Temp: 99.2 °F (37.3 °C) 98.3 °F (36.8 °C)  97.9 °F (36.6 °C)   SpO2: 97% 99%  98%   Weight:       Height:           No results found for: VALF2, VALAC, VALP, VALPR, DS6, CRBAM, CRBAMP, CARB2, XCRBAM  No results found for: LITHM    Vital Signs  Patient Vitals for the past 24 hrs:   Temp Pulse Resp BP SpO2   06/20/20 0741 97.9 °F (36.6 °C) 98 16 122/80 98 %   06/19/20 2205 -- 87 -- 130/85 --   06/19/20 1541 98.3 °F (36.8 °C) 75 16 106/56 99 %     Wt Readings from Last 3 Encounters:   06/16/20 63.5 kg (140 lb)   06/16/20 66.4 kg (146 lb 6.2 oz)   09/20/17 60.9 kg (134 lb 3.2 oz)     Temp Readings from Last 3 Encounters:   06/20/20 97.9 °F (36.6 °C)   06/16/20 98.1 °F (36.7 °C)   06/14/17 98.3 °F (36.8 °C)     BP Readings from Last 3 Encounters:   06/20/20 122/80   06/16/20 140/86   09/20/17 128/80     Pulse Readings from Last 3 Encounters:   06/20/20 98   06/16/20 72   09/20/17 88       Radiology (reviewed/updated 6/20/2020)  Xr Chest Pa Lat    Result Date: 6/16/2020  EXAM: XR CHEST PA LAT INDICATION: cough COMPARISON: None. FINDINGS: PA and lateral radiographs of the chest demonstrate mildly hyperexpanded lungs without demonstration of consolidation or pulmonary edema. There is no pneumothorax or pleural effusion. Cardiac, mediastinal and hilar contours are normal. Minimal thoracic spine degenerative changes are shown. IMPRESSION: No acute findings. Side Effects: (reviewed/updated 6/20/2020)  None reported or admitted to.     Review of Systems: (reviewed/updated 6/20/2020)  Appetite: good  Sleep: good   All other Review of Systems: negative    Mental Status Exam:  Eye contact: Good eye contact  Psychomotor activity: Anxious  Speech is spontaneous  Thought process: Logical and goal directed   Mood is Depressed  Affect: constricted  Perception: No avh  Suicidal ideation: No si  Homicidal ideation: No hi  Insight/judgment: Partial   Cognition is grossly intact      Physical Exam:  Musculoskeletal system: steady gait  Tremor not present  Cog wheeling not present      Assessment and Plan:  Sesar Maldonado meets criteria for diagnoses of   Substance-induced mood disorder, opiate use disorder, severe. Continue current medications as prescribed. We will closely monitor for safety. We will encourage reality orientation. Tentative dc Monday if weekend goes well. I certify that this patients inpatient psychiatric hospital services furnished since the previous certification were, and continue to be, required for treatment that could reasonably be expected to improve the patient's condition, or for diagnostic study, and that the patient continues to need, on a daily basis, active treatment furnished directly by or requiring the supervision of inpatient psychiatric facility personnel. In addition, the hospital records show that services furnished were intensive treatment services, admission or related services, or equivalent services.       Signed:  Yogesh Agarwal NP  6/20/2020

## 2020-06-20 NOTE — BH NOTES
PRN Medication Documentation    Specific patient behavior that led to need for PRN medication: pt reporting anxiety  Staff interventions attempted prior to PRN being given: cow assessment, vital sign assessment  PRN medication given: atarax po prn at 1710  Patient response/effectiveness of PRN medication: 40 minutes post administration pt reports anxiety has lessened    PRN Medication Documentation    Specific patient behavior that led to need for PRN medication: pt reporting \"I am having a panic attack, the trazodone caused it\", pt is pacing, crying, increasing in agitation  Staff interventions attempted prior to PRN being given: cow assessment remarkable for severe anxiety, otherwise asymptomatic,   PRN medication given: trazodone po prn at 2100, above reviewed with KAIT Vargas NP, order received for one time dose of ativan 2mg IM, at 2214 pt received ativan 2mg IM, readily exposed skin for administration  Patient response/effectiveness of PRN medication:

## 2020-06-21 PROCEDURE — 65220000003 HC RM SEMIPRIVATE PSYCH

## 2020-06-21 PROCEDURE — 74011250637 HC RX REV CODE- 250/637: Performed by: NURSE PRACTITIONER

## 2020-06-21 RX ADMIN — CLONIDINE HYDROCHLORIDE 0.1 MG: 0.1 TABLET ORAL at 17:58

## 2020-06-21 RX ADMIN — ONDANSETRON 4 MG: 4 TABLET, ORALLY DISINTEGRATING ORAL at 16:00

## 2020-06-21 RX ADMIN — GUAIFENESIN 600 MG: 600 TABLET, EXTENDED RELEASE ORAL at 08:41

## 2020-06-21 RX ADMIN — GUAIFENESIN 600 MG: 600 TABLET, EXTENDED RELEASE ORAL at 21:17

## 2020-06-21 RX ADMIN — HYDROXYZINE HYDROCHLORIDE 50 MG: 50 TABLET ORAL at 20:24

## 2020-06-21 RX ADMIN — CITALOPRAM HYDROBROMIDE 10 MG: 20 TABLET ORAL at 08:41

## 2020-06-21 NOTE — PROGRESS NOTES
Problem: Anxiety  Goal: *Alleviation of anxiety  Outcome: Progressing Towards Goal  Note: Pt. States she  is less anxious this morning  feels  a little sedated  from the medication last night     Problem: Depressed Mood (Adult/Pediatric)  Goal: *STG: Participates in treatment plan  Outcome: Progressing Towards Goal  Note: Pt. Out on unit for breakfast   \"I feel less depressed and am ready to go home  tomorrow\"     Problem: Depressed Mood (Adult/Pediatric)  Goal: *STG: Remains safe in hospital  Outcome: Progressing Towards Goal  Note: Denies suicidal ideation      Problem: Depressed Mood (Adult/Pediatric)  Goal: *STG: Complies with medication therapy  Outcome: Progressing Towards Goal     Problem: Opiate Withdrawal  Goal: *STG: Participates in treatment plan  Outcome: Progressing Towards Goal  Note: COW     Problem: Opiate Withdrawal  Goal: *STG: Vital signs within defined limits  Outcome: Progressing Towards Goal     Problem: Depressed Mood (Adult/Pediatric)  Goal: Interventions  Outcome: Progressing Towards Goal  Note: Will continue to monitor  on 15 min.  Checks for safety  assess depression anxiety  COW    medication compliance  effectiveness  encourage unit participation

## 2020-06-21 NOTE — BH NOTES
PRN Medication Documentation    Specific patient behavior that led to need for PRN medication: pt complaining of opiate withdrawal  Staff interventions attempted prior to PRN being given: cow assessment of 6, vital sign assessment  PRN medication given: clonidine 0.1mg po prn at 1759  Patient response/effectiveness of PRN medication: 35 minutes post adminsitration pt cow is now 3

## 2020-06-21 NOTE — PROGRESS NOTES
PSYCHIATRIC PROGRESS NOTE       Patient: Isidoro Joyce MRN: [id number]  SSN: [id number]    YOB: 1970  Age: 44 y.o. Sex: female      Admit Date: 6/16/2020    LOS: 5 days       Chief Complaint:  \"I feel a lot better\"    Interval History:  Isidoro Joyce endorses she feels a lot better and is not longer achy. She denies SI/HI/AVH and endorses she is going to take a nap and then get up and take a shower. She is calm and pleasant during assessment. Endorses she eating and sleeping well at this time. No side effects noted to her medications. Patient had PRN last night for withdrawal. Compliant with staff and meds. Past Medical History:  Past Medical History:   Diagnosis Date    Anxiety 9/20/2017    Depression 9/20/2017    Hepatitis C 9/20/2017    Treated; in remission    Migraine headache 9/20/2017    Sun-damaged skin     Sunburn, blistering     Tanning bed exposure          ALLERGIES:(reviewed/updated 6/21/2020)  Allergies   Allergen Reactions    Sulfa (Sulfonamide Antibiotics) Hives       Laboratory report:  Lab Results   Component Value Date/Time    WBC 6.1 06/16/2020 12:07 PM    Hemoglobin (POC) 12.2 06/14/2017 10:39 PM    HGB 14.7 06/16/2020 12:07 PM    Hematocrit (POC) 36 06/14/2017 10:39 PM    HCT 45.7 06/16/2020 12:07 PM    PLATELET 625 25/30/7628 12:07 PM    MCV 91.2 06/16/2020 12:07 PM      Lab Results   Component Value Date/Time    Sodium 138 06/16/2020 12:07 PM    Potassium 4.0 06/16/2020 12:07 PM    Chloride 105 06/16/2020 12:07 PM    CO2 27 06/16/2020 12:07 PM    Anion gap 6 06/16/2020 12:07 PM    Glucose 84 06/16/2020 12:07 PM    BUN 6 06/16/2020 12:07 PM    Creatinine 0.74 06/16/2020 12:07 PM    BUN/Creatinine ratio 8 (L) 06/16/2020 12:07 PM    GFR est AA >60 06/16/2020 12:07 PM    GFR est non-AA >60 06/16/2020 12:07 PM    Calcium 8.7 06/16/2020 12:07 PM    Bilirubin, total 0.6 06/16/2020 12:07 PM    Alk.  phosphatase 46 06/16/2020 12:07 PM    Protein, total 7.6 06/16/2020 12:07 PM    Albumin 3.7 06/16/2020 12:07 PM    Globulin 3.9 06/16/2020 12:07 PM    A-G Ratio 0.9 (L) 06/16/2020 12:07 PM    ALT (SGPT) 15 06/16/2020 12:07 PM      Vitals:    06/20/20 0741 06/20/20 1539 06/20/20 2019 06/21/20 0751   BP: 122/80 126/84 114/77 (!) 142/95   Pulse: 98 100 93 100   Resp: 16 16 16 18   Temp: 97.9 °F (36.6 °C) 98.8 °F (37.1 °C) 98.7 °F (37.1 °C) 97.6 °F (36.4 °C)   SpO2: 98% 97% 97% 97%   Weight:       Height:           No results found for: VALF2, VALAC, VALP, VALPR, DS6, CRBAM, CRBAMP, CARB2, XCRBAM  No results found for: LITHM    Vital Signs  Patient Vitals for the past 24 hrs:   Temp Pulse Resp BP SpO2   06/21/20 0751 97.6 °F (36.4 °C) 100 18 (!) 142/95 97 %   06/20/20 2019 98.7 °F (37.1 °C) 93 16 114/77 97 %   06/20/20 1539 98.8 °F (37.1 °C) 100 16 126/84 97 %     Wt Readings from Last 3 Encounters:   06/16/20 63.5 kg (140 lb)   06/16/20 66.4 kg (146 lb 6.2 oz)   09/20/17 60.9 kg (134 lb 3.2 oz)     Temp Readings from Last 3 Encounters:   06/21/20 97.6 °F (36.4 °C)   06/16/20 98.1 °F (36.7 °C)   06/14/17 98.3 °F (36.8 °C)     BP Readings from Last 3 Encounters:   06/21/20 (!) 142/95   06/16/20 140/86   09/20/17 128/80     Pulse Readings from Last 3 Encounters:   06/21/20 100   06/16/20 72   09/20/17 88       Radiology (reviewed/updated 6/21/2020)  Xr Chest Pa Lat    Result Date: 6/16/2020  EXAM: XR CHEST PA LAT INDICATION: cough COMPARISON: None. FINDINGS: PA and lateral radiographs of the chest demonstrate mildly hyperexpanded lungs without demonstration of consolidation or pulmonary edema. There is no pneumothorax or pleural effusion. Cardiac, mediastinal and hilar contours are normal. Minimal thoracic spine degenerative changes are shown. IMPRESSION: No acute findings. Side Effects: (reviewed/updated 6/21/2020)  None reported or admitted to.     Review of Systems: (reviewed/updated 6/21/2020)  Appetite: good  Sleep: good   All other Review of Systems: negative    Mental Status Exam:  Eye contact: Good eye contact  Psychomotor activity: Anxious  Speech is spontaneous  Thought process: Logical and goal directed   Mood is Mildly constricted  Affect: constricted  Perception: No avh  Suicidal ideation: No si  Homicidal ideation: No hi  Insight/judgment: Partial   Cognition is grossly intact      Physical Exam:  Musculoskeletal system: steady gait  Tremor not present  Cog wheeling not present      Assessment and Plan:  Lee Ann Trotter meets criteria for diagnoses of   Substance-induced mood disorder, opiate use disorder, severe. Continue current medications as prescribed. We will closely monitor for safety. We will encourage reality orientation. Tentative dc Monday if weekend goes well. I certify that this patients inpatient psychiatric hospital services furnished since the previous certification were, and continue to be, required for treatment that could reasonably be expected to improve the patient's condition, or for diagnostic study, and that the patient continues to need, on a daily basis, active treatment furnished directly by or requiring the supervision of inpatient psychiatric facility personnel. In addition, the hospital records show that services furnished were intensive treatment services, admission or related services, or equivalent services.       Signed:  Jennifer Sales NP  6/21/2020

## 2020-06-21 NOTE — PROGRESS NOTES
Problem: Falls - Risk of  Goal: *Absence of Falls  Description: Document Shaheen Cease Fall Risk and appropriate interventions in the flowsheet. Outcome: Progressing Towards Goal  Note: Fall Risk Interventions:  Mobility Interventions: Assess mobility with egress test    Mentation Interventions: Adequate sleep, hydration, pain control    Medication Interventions: Teach patient to arise slowly    Resting in bed with eyes closed, no complaints, no distress noted. Safety measures in place, will continue to monitor.

## 2020-06-21 NOTE — PROGRESS NOTES
Problem: Depressed Mood (Adult/Pediatric)  Goal: *STG: Participates in treatment plan  Outcome: Progressing Towards Goal     Problem: Depressed Mood (Adult/Pediatric)  Goal: *STG: Attends activities and groups  Outcome: Not Progressing Towards Goal     Problem: Depressed Mood (Adult/Pediatric)  Goal: *STG: Remains safe in hospital  Outcome: Progressing Towards Goal     Problem: Depressed Mood (Adult/Pediatric)  Goal: *STG: Complies with medication therapy  Outcome: Progressing Towards Goal     Problem: Depressed Mood (Adult/Pediatric)  Goal: Interventions  Outcome: Progressing Towards Goal  Note: Pt is alert oriented in bed. NAD. COW monitoring continued. [id number]- pt is participating in treatment team meeting.

## 2020-06-21 NOTE — INTERDISCIPLINARY ROUNDS
Behavioral Health Interdisciplinary Rounds     Patient Name: Julien Baker  Age: 44 y.o. Room/Bed:  730/01  Primary Diagnosis: <principal problem not specified>   Admission Status: Voluntary     Readmission within 30 days: no  Power of  in place: no  Patient requires a blocked bed: no          Reason for blocked bed:     VTE Prophylaxis: No    Mobility needs/Fall risk: no  Flu Vaccine : no   Nutritional Plan: no  Consults:          Labs/Testing due today?: no    Sleep hours:  6      Participation in Care/Groups:   Medication Compliant?: Yes  PRNS (last 24 hours): Antianxiety and Sleep Aid    Restraints (last 24 hours):  no     CIWA (range last 24 hours):     COWS (range last 24 hours): COWS Total: 4    Alcohol screening (AUDIT) completed -   AUDIT Score: 0     If applicable, date SBIRT discussed in treatment team AND documented:   AUDIT Screen Score: AUDIT Score: 0      Document Brief Intervention (corresponds directly with the 5 A's, Ask, Advise, Assess, Assist, and Arrange): At- Risk Patients (Score 7-15 for women; 8-15 for men)  Discuss concern patient is drinking at unhealthy levels known to increase risk of alcohol-related health problems. Is Patient ready to commit to change? If No:   Encourage reflection   Discuss short term and long term health risks of consuming alcohol   Barriers to change   Reaffirm willingness to help / Educational materials provided  If Yes:   Set goal  Phylogy provided    Harmful use or Dependence (Score 16 or greater)   Discuss short term and long term health risks of consuming alcohol   Recommendations   Negotiate drinking goal   Recommend addiction specialist/center   Arrange follow-up appointments.     Tobacco - patient is a smoker: Have You Used Tobacco in the Past 30 Days: No  Illegal Drugs use: Have You Used Any Illegal Substances Over the Past 12 Months: Yes    24 hour chart check complete: yes     Patient goal(s) for today:   Treatment team focus/goals:   Progress note     LOS:  5  Expected LOS:     Financial concerns/prescription coverage:    Family contact:      Family requesting physician contact today:    Discharge plan:   Access to weapons :        Outpatient provider(s):   Patient's preferred phone number for follow up call :     Participating treatment team members: Jm Grace, * (assigned SW),

## 2020-06-22 VITALS
DIASTOLIC BLOOD PRESSURE: 71 MMHG | OXYGEN SATURATION: 98 % | BODY MASS INDEX: 22.76 KG/M2 | TEMPERATURE: 97.8 F | WEIGHT: 141.6 LBS | RESPIRATION RATE: 14 BRPM | HEIGHT: 66 IN | HEART RATE: 87 BPM | SYSTOLIC BLOOD PRESSURE: 117 MMHG

## 2020-06-22 PROCEDURE — 74011250637 HC RX REV CODE- 250/637: Performed by: NURSE PRACTITIONER

## 2020-06-22 RX ORDER — CLONIDINE HYDROCHLORIDE 0.1 MG/1
0.1 TABLET ORAL
Qty: 7 TAB | Refills: 0 | Status: SHIPPED | OUTPATIENT
Start: 2020-06-22 | End: 2022-06-29

## 2020-06-22 RX ORDER — CITALOPRAM 10 MG/1
10 TABLET ORAL DAILY
Qty: 30 TAB | Refills: 0 | Status: SHIPPED | OUTPATIENT
Start: 2020-06-23 | End: 2022-06-29

## 2020-06-22 RX ORDER — HYDROXYZINE 50 MG/1
50 TABLET, FILM COATED ORAL
Qty: 30 TAB | Refills: 0 | Status: SHIPPED | OUTPATIENT
Start: 2020-06-22 | End: 2022-06-29

## 2020-06-22 RX ADMIN — HYDROXYZINE HYDROCHLORIDE 50 MG: 50 TABLET ORAL at 02:28

## 2020-06-22 RX ADMIN — OLANZAPINE 5 MG: 5 TABLET, FILM COATED ORAL at 02:28

## 2020-06-22 RX ADMIN — GUAIFENESIN 600 MG: 600 TABLET, EXTENDED RELEASE ORAL at 08:17

## 2020-06-22 RX ADMIN — CITALOPRAM HYDROBROMIDE 10 MG: 20 TABLET ORAL at 08:17

## 2020-06-22 NOTE — BH NOTES
PRN Medication Documentation    Specific patient behavior that led to need for PRN medication: difficulty sleeping, verbalized feeling agitated and anxious and sweating.   Staff interventions attempted prior to PRN being given: Evaluation  PRN medication given: Atarax 50 mg po and Zyprexa 5 mg po @ 0230  Patient response/effectiveness of PRN medication: TBD

## 2020-06-22 NOTE — DISCHARGE INSTRUCTIONS
DISCHARGE SUMMARY    NAME:Aster Sanabria  : 1970  MRN: [id number]    The patient Siomarabhavin Morrow exhibits the ability to control behavior in a less restrictive environment. Patient's level of functioning is improving. No assaultive/destructive behavior has been observed for the past 24 hours. No suicidal/homicidal threat or behavior has been observed for the past 24 hours. There is no evidence of serious medication side effects. Patient has not been in physical or protective restraints for at least the past 24 hours. If weapons involved, how are they secured? na    Is patient aware of and in agreement with discharge plan? yes    Arrangements for medication:  Prescriptions given to patient. Copy of discharge instructions to provider?:  Yes, fax to the [address], [phone] for transportation home:  Father to      Keep all follow up appointments as scheduled, continue to take prescribed medications per physician instructions. Mental health crisis number:  [phone] or your local mental health crisis line number at [phone] from Nurse    PATIENT INSTRUCTIONS:        What to do at Home:  Recommended activity: Activity as tolerated,     If you experience any of the following symptoms thoughts of hurting yourself or others, please follow up with 911 or your local mental health crisis line. *  Please give a list of your current medications to your Primary Care Provider. *  Please update this list whenever your medications are discontinued, doses are      changed, or new medications (including over-the-counter products) are added. *  Please carry medication information at all times in case of emergency situations. These are general instructions for a healthy lifestyle:    No smoking/ No tobacco products/ Avoid exposure to second hand smoke  Surgeon General's Warning:  Quitting smoking now greatly reduces serious risk to your health.     Obesity, smoking, and sedentary lifestyle greatly increases your risk for illness    A healthy diet, regular physical exercise & weight monitoring are important for maintaining a healthy lifestyle    You may be retaining fluid if you have a history of heart failure or if you experience any of the following symptoms:  Weight gain of 3 pounds or more overnight or 5 pounds in a week, increased swelling in our hands or feet or shortness of breath while lying flat in bed. Please call your doctor as soon as you notice any of these symptoms; do not wait until your next office visit. The discharge information has been reviewed with the patient. The patient verbalized understanding. Discharge medications reviewed with the patient and appropriate educational materials and side effects teaching were provided.   ___________________________________________________________________________________________________________________________________

## 2020-06-22 NOTE — PROGRESS NOTES
Problem: Anxiety  Goal: *Alleviation of anxiety  Outcome: Progressing Towards Goal     Problem: Depressed Mood (Adult/Pediatric)  Goal: *STG: Participates in treatment plan  Outcome: Progressing Towards Goal  Note: Patient s participatory in treatment team. Denies SI/HI. Mood is calm and cooperative. Patient is ready to go home.  Discharge today  Goal: *STG: Remains safe in hospital  Outcome: Progressing Towards Goal  Note: Safe  Goal: *STG: Complies with medication therapy  Outcome: Progressing Towards Goal  Note: Compliant  Goal: Interventions  Outcome: Progressing Towards Goal

## 2020-06-22 NOTE — INTERDISCIPLINARY ROUNDS
Behavioral Health Interdisciplinary Rounds     Patient Name: Ferdinand Nash  Age: 44 y.o. Room/Bed:  730/  Primary Diagnosis: <principal problem not specified>   Admission Status: Voluntary     Readmission within 30 days: no  Power of  in place: no  Patient requires a blocked bed: no          Reason for blocked bed:     VTE Prophylaxis: No    Mobility needs/Fall risk: no  Flu Vaccine : no   Nutritional Plan: no  Consults:          Labs/Testing due today?: no    Sleep hours:  6      Participation in Care/Groups:   Medication Compliant?: Yes  PRNS (last 24 hours): Antianxiety and Zofran    Restraints (last 24 hours):  no     CIWA (range last 24 hours):     COWS (range last 24 hours): COWS Total: 0    Alcohol screening (AUDIT) completed -   AUDIT Score: 0     If applicable, date SBIRT discussed in treatment team AND documented:   AUDIT Screen Score: AUDIT Score: 0    Tobacco - patient is a smoker: Have You Used Tobacco in the Past 30 Days: No  Illegal Drugs use: Have You Used Any Illegal Substances Over the Past 12 Months: Yes    24 hour chart check complete: yes     Patient goal(s) for today:   Treatment team focus/goals: Plan to discharge today. Progress note : Plan for discharge today. LOS:  6  Expected LOS: today     Financial concerns/prescription coverage:  Medicaid   Family contact: father      Family requesting physician contact today:    Discharge plan: she will return home with her father   Access to weapons :  no      Outpatient provider(s): referred to the [id number] [facility]   Patient's preferred phone number for follow up call :     Participating treatment team members: Ferdinand Nash, Echo Waters [address], PharmD.

## 2020-06-22 NOTE — BH NOTES
Behavioral Health Transition Record to Provider    Patient Name: Sesar Maldonado  YOB: 1970  Medical Record Number: [id number]  Date of Admission: 6/16/2020  Date of Discharge: 6/22/2020    Attending Provider: Johnathon Buitrago MD  Discharging Provider: Estrellita Del Rio   To contact this individual call [phone] and ask the  to page. If unavailable, ask to be transferred to [facility] Provider on call. [facility] Provider will be available on call 24/7 and during holidays. Primary Care Provider: Juilus Hayes MD    Allergies   Allergen Reactions    Sulfa (Sulfonamide Antibiotics) Hives       Reason for Admission: Admission data:  CHIEF COMPLAINT:  Heroin.     HISTORY OF PRESENT ILLNESS:  The patient is a 66-year-old female who is currently admitted at [address] [place] [address] [facility] on a voluntary basis.  She states that she is seeking inpatient admission to get treated for her heroin dependence.  She states that she has been overwhelmed with psychosocial stressors. Shaheen Galvan has a hard time finding a job.  She used to work for a dental office as a  for 35 years, but she lost it when her mother passed away from ovarian cancer a year ago. Shaheen Galvan states that she started snorting half a gram of heroin on a daily basis since her mother passed away.  She also uses cocaine \"at times. \" Erick Lundberg urine drug screen is positive for opiates and cocaine.  She states that she has never sought treatment for opiates and never been to any substance abuse program.  While she was in the emergency room, she was being offered to go to [facility] for methadone treatment, but then she stated if she gets discharged, she will go home and take medications to hurt herself.  When I asked her about this, she states that she only said she was having thoughts of hurting herself in the ER, so she could be admitted in the hospital. Shaheen Galvan also reported that she ran out of money to buy heroin after her father and boyfriend who both have been financially helping her stopped giving her money yesterday  She admits that her father is not aware of her opiate dependence.  No history of prior suicide attempt. She states that she was on Celexa two years ago and it seemed to help with her mood. Tip Hernandezr states that she is feeling depressed, but no thoughts of suicide. She denies suicidal ideation, homicidal ideation, or auditory or visual hallucinations.        Admission Diagnosis: Substance abuse ([facility] [address] [F19.10]  Depression [F32.9]    * No surgery found *    Results for orders placed or performed during the hospital encounter of 06/16/20   SARS-COV-2, PCR   Result Value Ref Range    Specimen source Nasopharyngeal      SARS-CoV-2 Not detected NOTD     CBC WITH AUTOMATED DIFF   Result Value Ref Range    WBC 6.1 3.6 - 11.0 K/uL    RBC 5.01 3.80 - 5.20 M/uL    HGB 14.7 11.5 - 16.0 g/dL    HCT 45.7 35.0 - 47.0 %    MCV 91.2 80.0 - 99.0 FL    MCH 29.3 26.0 - 34.0 PG    MCHC 32.2 30.0 - 36.5 g/dL    RDW 12.2 11.5 - 14.5 %    PLATELET 407 109 - 149 K/uL    MPV 11.3 8.9 - 12.9 FL    NRBC 0.0 0  WBC    ABSOLUTE NRBC 0.00 0.00 - 0.01 K/uL    NEUTROPHILS 71 32 - 75 %    LYMPHOCYTES 21 12 - 49 %    MONOCYTES 6 5 - 13 %    EOSINOPHILS 2 0 - 7 %    BASOPHILS 0 0 - 1 %    IMMATURE GRANULOCYTES 0 0.0 - 0.5 %    ABS. NEUTROPHILS 4.3 1.8 - 8.0 K/UL    ABS. LYMPHOCYTES 1.3 0.8 - 3.5 K/UL    ABS. MONOCYTES 0.4 0.0 - 1.0 K/UL    ABS. EOSINOPHILS 0.1 0.0 - 0.4 K/UL    ABS. BASOPHILS 0.0 0.0 - 0.1 K/UL    ABS. IMM.  GRANS. 0.0 0.00 - 0.04 K/UL    DF AUTOMATED     METABOLIC PANEL, COMPREHENSIVE   Result Value Ref Range    Sodium 138 136 - 145 mmol/L    Potassium 4.0 3.5 - 5.1 mmol/L    Chloride 105 97 - 108 mmol/L    CO2 27 21 - 32 mmol/L    Anion gap 6 5 - 15 mmol/L    Glucose 84 65 - 100 mg/dL    BUN 6 6 - 20 MG/DL    Creatinine 0.74 0.55 - 1.02 MG/DL    BUN/Creatinine ratio 8 (L) 12 - 20      GFR est AA >60 >60 ml/min/1.73m2 GFR est non-AA >60 >60 ml/min/1.73m2    Calcium 8.7 8.5 - 10.1 MG/DL    Bilirubin, total 0.6 0.2 - 1.0 MG/DL    ALT (SGPT) 15 12 - 78 U/L    AST (SGOT) 15 15 - 37 U/L    Alk. phosphatase 46 45 - 117 U/L    Protein, total 7.6 6.4 - 8.2 g/dL    Albumin 3.7 3.5 - 5.0 g/dL    Globulin 3.9 2.0 - 4.0 g/dL    A-G Ratio 0.9 (L) 1.1 - 2.2     DRUG SCREEN, URINE   Result Value Ref Range    AMPHETAMINES Negative NEG      BARBITURATES Negative NEG      BENZODIAZEPINES Negative NEG      COCAINE Positive (A) NEG      METHADONE Negative NEG      OPIATES Positive (A) NEG      PCP(PHENCYCLIDINE) Negative NEG      THC (TH-CANNABINOL) Negative NEG      Drug screen comment (NOTE)    ETHYL ALCOHOL   Result Value Ref Range    ALCOHOL(ETHYL),SERUM <10 <10 MG/DL   SARS-COV-2   Result Value Ref Range    Specimen source Nasopharyngeal      Specimen source Nasopharyngeal      COVID-19 rapid test Not detected NOTD         Immunizations administered during this encounter: There is no immunization history on file for this patient. Screening for Metabolic Disorders for Patients on Antipsychotic Medications  (Data obtained from the EMR)    Estimated Body Mass Index  Estimated body mass index is 22.85 kg/m² as calculated from the following:    Height as of this encounter: 5' 6\" (1.676 m). Weight as of this encounter: 64.2 kg (141 lb 9.6 oz).      Vital Signs/Blood Pressure  Visit Vitals  /71 (BP 1 Location: Right arm, BP Patient Position: At rest;Lying left side)   Pulse 87   Temp 97.8 °F (36.6 °C)   Resp 14   Ht 5' 6\" (1.676 m)   Wt 64.2 kg (141 lb 9.6 oz)   SpO2 98%   BMI 22.85 kg/m²       Blood Glucose/Hemoglobin A1c  Lab Results   Component Value Date/Time    Glucose 84 06/16/2020 12:07 PM    Glucose (POC) 139 (H) 06/14/2017 10:39 PM       No results found for: HBA1C, HGBE8, HML1GLJH     Lipid Panel  No results found for: CHOL, CHOLX, CHLST, CHOLV, 068265, HDL, HDLP, LDL, LDLC, DLDLP, TGLX, TRIGL, TRIGP, CHHD, CHHDX Discharge Diagnosis: Substance-induced mood disorder, opiate use disorder, severe. Discharge Plan: She will return home and live with her father. The patient Aurora Chavez exhibits the ability to control behavior in a less restrictive environment. Patient's level of functioning is improving. No assaultive/destructive behavior has been observed for the past 24 hours. No suicidal/homicidal threat or behavior has been observed for the past 24 hours. There is no evidence of serious medication side effects. Patient has not been in physical or protective restraints for at least the past 24 hours. If weapons involved, how are they secured? na    Is patient aware of and in agreement with discharge plan? yes    Arrangements for medication:  Prescriptions given to patient. Copy of discharge instructions to provider?:  Yes, fax to the [address], [address] for transportation home:  Father to      Keep all follow up appointments as scheduled, continue to take prescribed medications per physician instructions. Mental health crisis number:  [id number] or your local mental health crisis line number at [phone]         Discharge Medication List and Instructions:   Current Discharge Medication List      START taking these medications    Details   citalopram (CELEXA) 10 mg tablet Take 1 Tab by mouth daily. Indications: anxiousness associated with depression  Qty: 30 Tab, Refills: 0      cloNIDine HCL (CATAPRES) 0.1 mg tablet Take 1 Tab by mouth every six (6) hours as needed for Opioid Withdrawal. Indications: symptoms from stopping treatment with opioid drugs  Qty: 7 Tab, Refills: 0      hydrOXYzine HCL (ATARAX) 50 mg tablet Take 1 Tab by mouth three (3) times daily as needed for Anxiety.  Indications: anxious  Qty: 30 Tab, Refills: 0             Unresulted Labs (24h ago, onward)    None        To obtain results of studies pending at discharge, please contact [phone]    Follow-up Information Follow up With Specialties Details Why [address] [facility] [address]   On 7/6/2020 you have a 1:00 appointment  [address]   [phone]   [facility], [address] [place]    Verna Hammond MD Internal Medicine   [facility] [address]  [address] [address]  [phone]            Advanced Directive:   Does the patient have an appointed surrogate decision maker? No  Does the patient have a Medical Advance Directive? No  Does the patient have a Psychiatric Advance Directive? No  If the patient does not have a surrogate or Medical Advance Directive AND Psychiatric Advance Directive, the patient was offered information on these advance directives Patient declined to complete    Patient Instructions: Please continue all medications until otherwise directed by physician. Tobacco Cessation Discharge Plan:   Is the patient a smoker and needs referral for smoking cessation? No  Patient referred to the following for smoking cessation with an appointment? No     Patient was offered medication to assist with smoking cessation at discharge? No  Was education for smoking cessation added to the discharge instructions? Yes    Alcohol/Substance Abuse Discharge Plan:   Does the patient have a history of substance/alcohol abuse and requires a referral for treatment? Yes  Patient referred to the following for substance/alcohol abuse treatment with an appointment? Yes  Patient was offered medication to assist with alcohol cessation at discharge? no  Was education for substance/alcohol abuse added to discharge instructions? No    Patient discharged to Home; discussed with patient/caregiver and provided to the patient/caregiver either in hard copy or electronically.

## 2020-06-22 NOTE — PROGRESS NOTES
Isolative to room  Hopeless and helpless  Staff encourage pt to increase time out of room  Denies SI or HI and agrees to inform staff if feelings to harm self or others occur        Problem: Discharge Planning  Goal: *Discharge to safe environment  Outcome: Progressing Towards Goal  Goal: *Knowledge of medication management  Outcome: Progressing Towards Goal     Problem: Depressed Mood (Adult/Pediatric)  Goal: *STG: Remains safe in hospital  Outcome: Progressing Towards Goal  Goal: *STG: Complies with medication therapy  Outcome: Progressing Towards Goal     Problem: Depressed Mood (Adult/Pediatric)  Goal: *STG: Attends activities and groups  Outcome: Not Progressing Towards Goal

## 2020-06-22 NOTE — DISCHARGE SUMMARY
PSYCHIATRIC DISCHARGE SUMMARY      Patient: Dino Carr MRN: [id number]  SSN: [id number]    YOB: 1970  Age: 44 y.o. Sex: female        Date of Admission: 6/16/2020  Date of Discharge:6/22/2020       Type of Discharge:  REGULAR     Admission data:  CHIEF COMPLAINT:  Heroin.     HISTORY OF PRESENT ILLNESS:  The patient is a 47-year-old female who is currently admitted at [address] [place] [address] on a voluntary basis. She states that she is seeking inpatient admission to get treated for her heroin dependence. She states that she has been overwhelmed with psychosocial stressors. She has a hard time finding a job. She used to work for a dental office as a  for 35 years, but she lost it when her mother passed away from ovarian cancer a year ago. She states that she started snorting half a gram of heroin on a daily basis since her mother passed away. She also uses cocaine \"at times. \"  Her urine drug screen is positive for opiates and cocaine. She states that she has never sought treatment for opiates and never been to any substance abuse program.  While she was in the emergency room, she was being offered to go to [facility] for methadone treatment, but then she stated if she gets discharged, she will go home and take medications to hurt herself. When I asked her about this, she states that she only said she was having thoughts of hurting herself in the ER, so she could be admitted in the hospital.  She also reported that she ran out of money to buy heroin after her father and boyfriend who both have been financially helping her stopped giving her money yesterday  She admits that her father is not aware of her opiate dependence. No history of prior suicide attempt. She states that she was on Celexa two years ago and it seemed to help with her mood. She states that she is feeling depressed, but no thoughts of suicide.  She denies suicidal ideation, homicidal ideation, or auditory or visual hallucinations.      PAST MEDICAL HISTORY:  See H and P.     Labs: (reviewed/updated 6/18/2020)  Patient Vitals for the past 8 hrs:    BP Temp Pulse Resp SpO2   06/18/20 0747 146/81 98.6 °F (37 °C) 93 18 97 %      Labs Reviewed - No data to display  Lab Results   Component Value Date/Time     Sodium 138 06/16/2020 12:07 PM     Potassium 4.0 06/16/2020 12:07 PM     Chloride 105 06/16/2020 12:07 PM     CO2 27 06/16/2020 12:07 PM     Anion gap 6 06/16/2020 12:07 PM     Glucose 84 06/16/2020 12:07 PM     BUN 6 06/16/2020 12:07 PM     Creatinine 0.74 06/16/2020 12:07 PM     BUN/Creatinine ratio 8 (L) 06/16/2020 12:07 PM     GFR est AA >60 06/16/2020 12:07 PM     GFR est non-AA >60 06/16/2020 12:07 PM     Calcium 8.7 06/16/2020 12:07 PM     Bilirubin, total 0.6 06/16/2020 12:07 PM     Alk. phosphatase 46 06/16/2020 12:07 PM     Protein, total 7.6 06/16/2020 12:07 PM     Albumin 3.7 06/16/2020 12:07 PM     Globulin 3.9 06/16/2020 12:07 PM     A-G Ratio 0.9 (L) 06/16/2020 12:07 PM     ALT (SGPT) 15 06/16/2020 12:07 PM      No visits with results within 2 Day(s) from this visit.    Latest known visit with results is:   Admission on 06/16/2020, Discharged on 06/16/2020   Component Date Value Ref Range Status    WBC 06/16/2020 6.1  3.6 - 11.0 K/uL Final    RBC 06/16/2020 5.01  3.80 - 5.20 M/uL Final    HGB 06/16/2020 14.7  11.5 - 16.0 g/dL Final    HCT 06/16/2020 45.7  35.0 - 47.0 % Final    MCV 06/16/2020 91.2  80.0 - 99.0 FL Final    MCH 06/16/2020 29.3  26.0 - 34.0 PG Final    MCHC 06/16/2020 32.2  30.0 - 36.5 g/dL Final    RDW 06/16/2020 12.2  11.5 - 14.5 % Final    PLATELET 31/27/8347 318  150 - 400 K/uL Final    MPV 06/16/2020 11.3  8.9 - 12.9 FL Final    NRBC 06/16/2020 0.0  0  WBC Final    ABSOLUTE NRBC 06/16/2020 0.00  0.00 - 0.01 K/uL Final    NEUTROPHILS 06/16/2020 71  32 - 75 % Final    LYMPHOCYTES 06/16/2020 21  12 - 49 % Final    MONOCYTES 06/16/2020 6  5 - 13 % Final    EOSINOPHILS 06/16/2020 2  0 - 7 % Final    BASOPHILS 06/16/2020 0  0 - 1 % Final    IMMATURE GRANULOCYTES 06/16/2020 0  0.0 - 0.5 % Final    ABS. NEUTROPHILS 06/16/2020 4.3  1.8 - 8.0 K/UL Final    ABS. LYMPHOCYTES 06/16/2020 1.3  0.8 - 3.5 K/UL Final    ABS. MONOCYTES 06/16/2020 0.4  0.0 - 1.0 K/UL Final    ABS. EOSINOPHILS 06/16/2020 0.1  0.0 - 0.4 K/UL Final    ABS. BASOPHILS 06/16/2020 0.0  0.0 - 0.1 K/UL Final    ABS. IMM. GRANS. 06/16/2020 0.0  0.00 - 0.04 K/UL Final    DF 06/16/2020 AUTOMATED    Final    Sodium 06/16/2020 138  136 - 145 mmol/L Final    Potassium 06/16/2020 4.0  3.5 - 5.1 mmol/L Final    Chloride 06/16/2020 105  97 - 108 mmol/L Final    CO2 06/16/2020 27  21 - 32 mmol/L Final    Anion gap 06/16/2020 6  5 - 15 mmol/L Final    Glucose 06/16/2020 84  65 - 100 mg/dL Final    BUN 06/16/2020 6  6 - 20 MG/DL Final    Creatinine 06/16/2020 0.74  0.55 - 1.02 MG/DL Final    BUN/Creatinine ratio 06/16/2020 8* 12 - 20   Final    GFR est AA 06/16/2020 >60  >60 ml/min/1.73m2 Final    GFR est non-AA 06/16/2020 >60  >60 ml/min/1.73m2 Final    Calcium 06/16/2020 8.7  8.5 - 10.1 MG/DL Final    Bilirubin, total 06/16/2020 0.6  0.2 - 1.0 MG/DL Final    ALT (SGPT) 06/16/2020 15  12 - 78 U/L Final    AST (SGOT) 06/16/2020 15  15 - 37 U/L Final    Alk.  phosphatase 06/16/2020 46  45 - 117 U/L Final    Protein, total 06/16/2020 7.6  6.4 - 8.2 g/dL Final    Albumin 06/16/2020 3.7  3.5 - 5.0 g/dL Final    Globulin 06/16/2020 3.9  2.0 - 4.0 g/dL Final    A-G Ratio 06/16/2020 0.9* 1.1 - 2.2   Final    AMPHETAMINES 06/16/2020 Negative  NEG   Final    BARBITURATES 06/16/2020 Negative  NEG   Final    BENZODIAZEPINES 06/16/2020 Negative  NEG   Final    COCAINE 06/16/2020 Positive* NEG   Final    METHADONE 06/16/2020 Negative  NEG   Final    OPIATES 06/16/2020 Positive* NEG   Final    PCP(PHENCYCLIDINE) 06/16/2020 Negative  NEG   Final    THC (TH-CANNABINOL) 06/16/2020 Negative  NEG   Final  Drug screen comment 06/16/2020 (NOTE)    Final    ALCOHOL(ETHYL),SERUM 06/16/2020 <10  <10 MG/DL Final    Specimen source 06/16/2020 Nasopharyngeal    Final    Specimen source 06/16/2020 Nasopharyngeal    Final    COVID-19 rapid test 06/16/2020 Not detected  NOTD   Final    Specimen source 06/16/2020 Nasopharyngeal    Final    SARS-CoV-2 06/16/2020 Not detected  NOTD   Final             Vitals:     06/17/20 1148 06/17/20 1558 06/17/20 2008 06/18/20 0747   BP: 128/61 145/75 120/73 146/81   Pulse: 81 96 88 93   Resp: 16 16 16 18   Temp: 98.8 °F (37.1 °C) 98.6 °F (37 °C) 98.3 °F (36.8 °C) 98.6 °F (37 °C)   SpO2: 98% 97%   97%   Weight:           Height:              Recent Results   No results found for this or any previous visit (from the past 24 hour(s)).        RADIOLOGY REPORTS:       Results from Hospital Encounter encounter on 06/16/20   XR CHEST PA LAT     Narrative EXAM: XR CHEST PA LAT     INDICATION: cough     COMPARISON: None.     FINDINGS: PA and lateral radiographs of the chest demonstrate mildly  hyperexpanded lungs without demonstration of consolidation or pulmonary edema. There is no pneumothorax or pleural effusion. Cardiac, mediastinal and hilar  contours are normal. Minimal thoracic spine degenerative changes are shown.         Impression IMPRESSION: No acute findings. Xr Chest Pa Lat     Result Date: 6/16/2020  EXAM: XR CHEST PA LAT INDICATION: cough COMPARISON: None. FINDINGS: PA and lateral radiographs of the chest demonstrate mildly hyperexpanded lungs without demonstration of consolidation or pulmonary edema. There is no pneumothorax or pleural effusion. Cardiac, mediastinal and hilar contours are normal. Minimal thoracic spine degenerative changes are shown.      IMPRESSION: No acute findings.           PAST PSYCHIATRIC HISTORY:  This is her first psychiatric inpatient admission.   She is currently not receiving services for her mental health, was previously on Celexa two years ago.     PSYCHOSOCIAL HISTORY:  She is , but in a relationship. She has one 71-year-old son. She is currently unemployed. She states her dad is helping her financially. She lives back and forth with her dad and with her boyfriend.     MENTAL STATUS EXAM:  She is alert and oriented in all spheres. She is dressed in hospital apparel. Somewhat irritable during the interview. She reports her mood is okay. Affect is constricted. Speech, normal rate and rhythm. Thought process, logical and goal directed. She denies suicidal ideation, homicidal ideation, or auditory or visual hallucinations. Memory seems intact. Intelligence seems average. Insight is poor. Judgment is poor.     DIAGNOSES:  Substance-induced mood disorder, opiate use disorder, severe. Hospital Course:    Patient was admitted to the Psychiatric services for acute psychiatric stabilization in regards to symptomatology as described in the HPI above and placed on Q15 minute checks and suicide and withdrawal precautions. Standing medications were ordered. She was started on celexa, per her request. She was placed on detox per protocol. While on the unit Nemo Dye was involved in individual, group, occupational and milieu therapy. She improved gradually and was able to integrate into the milieu with help from the nursing staff. Patients symptoms improved gradually including withdrawal symptoms, sleep, suicidal thoughts, depression. She was appropriate in her interactions, and cooperative with medications and the unit routine. Please see individual progress notes for more specific details regarding patient's hospitalization course. Patient was discharged as per the plan. She had been doing well on the unit as per the report of the nursing staff and my observations. No PRN medication for agitation, seclusion or restraints were required during the last 48 hours of her stay.  Nemo Dye had improved progressively to the point of being stable for discharge and outpatient FU. At this time she did not offer any complaints. Patient denied any SI or HI. Denied any AH or VH. She denied any delusions. Was not considered a danger to self or to others and is safe for discharge. Will FU with her appointments and remains motivated to be in treatment. The patient verbalized understanding of her discharge instructions. Some parts of the discharge summary are from the initial Psychiatric interview that was done on admission by the admitting psychiatrist.       Allergies:(reviewed/updated 6/22/2020)  Allergies   Allergen Reactions    Sulfa (Sulfonamide Antibiotics) Hives       Side Effects: (reviewed/updated 6/22/2020)  None reported or admitted to. Vital Signs:  Patient Vitals for the past 24 hrs:   Temp Pulse Resp BP SpO2   06/22/20 0806 97.8 °F (36.6 °C) 87 14 117/71 98 %   06/21/20 1926 98.9 °F (37.2 °C) (!) 102 16 131/89 --   06/21/20 1545 99.5 °F (37.5 °C) 89 14 128/84 96 %     Wt Readings from Last 3 Encounters:   06/21/20 64.2 kg (141 lb 9.6 oz)   06/16/20 66.4 kg (146 lb 6.2 oz)   09/20/17 60.9 kg (134 lb 3.2 oz)     Temp Readings from Last 3 Encounters:   06/22/20 97.8 °F (36.6 °C)   06/16/20 98.1 °F (36.7 °C)   06/14/17 98.3 °F (36.8 °C)     BP Readings from Last 3 Encounters:   06/22/20 117/71   06/16/20 140/86   09/20/17 128/80     Pulse Readings from Last 3 Encounters:   06/22/20 87   06/16/20 72   09/20/17 88       Labs: (reviewed/updated 6/22/2020)  No results found for this or any previous visit (from the past 24 hour(s)). No results found for: VALF2, VALAC, VALP, VALPR, DS6, CRBAM, CRBAMP, CARB2, XCRBAM  No results found for: [facility]    Radiology (reviewed/updated 6/22/2020)  Xr Chest Pa Lat    Result Date: 6/16/2020  EXAM: XR CHEST PA LAT INDICATION: cough COMPARISON: None. FINDINGS: PA and lateral radiographs of the chest demonstrate mildly hyperexpanded lungs without demonstration of consolidation or pulmonary edema. There is no pneumothorax or pleural effusion. Cardiac, mediastinal and hilar contours are normal. Minimal thoracic spine degenerative changes are shown. IMPRESSION: No acute findings. Mental Status Exam on Discharge:  General appearance:   Momo Maldonado is a 44 y.o. WHITE OR  female who is well groomed, psychomotor activity is WNL  Eye contact: makes good eye contact  Speech: Spontaneous and coherent  Affect : Euthymic  Mood: \"OK\"  Thought Process: Logical, goal directed  Perception: Denies any AH or VH. Thought Content: Denies any SI or Plan  Insight: Partial  Judgement: Fair  Cognition: Intact grossly. Discharge Diagnoses:  Substance-induced mood disorder, opiate use disorder, severe. Current Discharge Medication List      START taking these medications    Details   citalopram (CELEXA) 10 mg tablet Take 1 Tab by mouth daily. Indications: anxiousness associated with depression  Qty: 30 Tab, Refills: 0      cloNIDine HCL (CATAPRES) 0.1 mg tablet Take 1 Tab by mouth every six (6) hours as needed for Opioid Withdrawal. Indications: symptoms from stopping treatment with opioid drugs  Qty: 7 Tab, Refills: 0      hydrOXYzine HCL (ATARAX) 50 mg tablet Take 1 Tab by mouth three (3) times daily as needed for Anxiety. Indications: anxious  Qty: 30 Tab, Refills: 0                  Follow-up Information     Follow up With Specialties Details Why Contact Info    [facility]   On 7/6/2020 you have a 1:00 appointment  [address] [place] [address]   [address]   [place], [address] [facility]    Nahed Corona MD Internal Medicine   [address]  [phone]          WOUND CARE: none needed. Prognosis:   Good / Siva Janett based on nature of patient's pathology/ies and treatment compliance issues. Prognosis is greatly dependent upon patient's ability to  follow up on psychiatric/psychotherapy appointments as well as to comply with psychiatric medications as prescribed. I certify that this patients inpatient psychiatric hospital services furnished since the previous certification were, and continue to be, required for treatment that could reasonably be expected to improve the patient's condition, or for diagnostic study, and that the patient continues to need, on a daily basis, active treatment furnished directly by or requiring the supervision of inpatient psychiatric facility personnel. In addition, the hospital records show that services furnished were intensive treatment services, admission or related services, or equivalent services.      Signed:  Deny Schulte NP  6/22/2020

## 2020-06-23 NOTE — PROGRESS NOTES
Reached out to patient at [phone] for follow up. Patient's voicemail came on and no message left due to confidentiality.

## 2020-07-17 ENCOUNTER — APPOINTMENT (OUTPATIENT)
Dept: GENERAL RADIOLOGY | Age: 50
End: 2020-07-17
Attending: EMERGENCY MEDICINE
Payer: COMMERCIAL

## 2020-07-17 ENCOUNTER — HOSPITAL ENCOUNTER (EMERGENCY)
Age: 50
Discharge: HOME | End: 2020-07-17
Attending: EMERGENCY MEDICINE | Admitting: EMERGENCY MEDICINE
Payer: COMMERCIAL

## 2020-07-17 VITALS
BODY MASS INDEX: 23.3 KG/M2 | TEMPERATURE: 97.6 F | OXYGEN SATURATION: 96 % | RESPIRATION RATE: 13 BRPM | WEIGHT: 145 LBS | DIASTOLIC BLOOD PRESSURE: 77 MMHG | HEIGHT: 66 IN | HEART RATE: 88 BPM | SYSTOLIC BLOOD PRESSURE: 131 MMHG

## 2020-07-17 DIAGNOSIS — R07.1 CHEST PAIN ON BREATHING: ICD-10-CM

## 2020-07-17 DIAGNOSIS — J22 LOWER RESPIRATORY INFECTION: Primary | ICD-10-CM

## 2020-07-17 LAB
ALBUMIN SERPL-MCNC: 3.3 G/DL (ref 3.5–5)
ALBUMIN/GLOB SERPL: 1 {RATIO} (ref 1.1–2.2)
ALP SERPL-CCNC: 39 U/L (ref 45–117)
ALT SERPL-CCNC: 18 U/L (ref 12–78)
ANION GAP SERPL CALC-SCNC: 6 MMOL/L (ref 5–15)
AST SERPL-CCNC: 15 U/L (ref 15–37)
ATRIAL RATE: 83 BPM
BASOPHILS # BLD: 0 K/UL (ref 0–0.1)
BASOPHILS NFR BLD: 0 % (ref 0–1)
BILIRUB SERPL-MCNC: 0.5 MG/DL (ref 0.2–1)
BUN SERPL-MCNC: 7 MG/DL (ref 6–20)
BUN/CREAT SERPL: 13 (ref 12–20)
CALCIUM SERPL-MCNC: 8.3 MG/DL (ref 8.5–10.1)
CALCULATED P AXIS, ECG09: 64 DEGREES
CALCULATED R AXIS, ECG10: 63 DEGREES
CALCULATED T AXIS, ECG11: 39 DEGREES
CHLORIDE SERPL-SCNC: 108 MMOL/L (ref 97–108)
CK SERPL-CCNC: 48 U/L (ref 26–192)
CO2 SERPL-SCNC: 28 MMOL/L (ref 21–32)
CREAT SERPL-MCNC: 0.55 MG/DL (ref 0.55–1.02)
DIAGNOSIS, 93000: NORMAL
DIFFERENTIAL METHOD BLD: ABNORMAL
EOSINOPHIL # BLD: 0.1 K/UL (ref 0–0.4)
EOSINOPHIL NFR BLD: 1 % (ref 0–7)
ERYTHROCYTE [DISTWIDTH] IN BLOOD BY AUTOMATED COUNT: 12.8 % (ref 11.5–14.5)
GLOBULIN SER CALC-MCNC: 3.2 G/DL (ref 2–4)
GLUCOSE SERPL-MCNC: 95 MG/DL (ref 65–100)
HCT VFR BLD AUTO: 46.7 % (ref 35–47)
HGB BLD-MCNC: 14.8 G/DL (ref 11.5–16)
IMM GRANULOCYTES # BLD AUTO: 0 K/UL (ref 0–0.04)
IMM GRANULOCYTES NFR BLD AUTO: 0 % (ref 0–0.5)
LYMPHOCYTES # BLD: 1.1 K/UL (ref 0.8–3.5)
LYMPHOCYTES NFR BLD: 15 % (ref 12–49)
MCH RBC QN AUTO: 29.5 PG (ref 26–34)
MCHC RBC AUTO-ENTMCNC: 31.7 G/DL (ref 30–36.5)
MCV RBC AUTO: 93.2 FL (ref 80–99)
MONOCYTES # BLD: 0.3 K/UL (ref 0–1)
MONOCYTES NFR BLD: 5 % (ref 5–13)
NEUTS SEG # BLD: 5.6 K/UL (ref 1.8–8)
NEUTS SEG NFR BLD: 79 % (ref 32–75)
NRBC # BLD: 0 K/UL (ref 0–0.01)
NRBC BLD-RTO: 0 PER 100 WBC
P-R INTERVAL, ECG05: 130 MS
PLATELET # BLD AUTO: 256 K/UL (ref 150–400)
PMV BLD AUTO: 11.3 FL (ref 8.9–12.9)
POTASSIUM SERPL-SCNC: 3.8 MMOL/L (ref 3.5–5.1)
PROT SERPL-MCNC: 6.5 G/DL (ref 6.4–8.2)
Q-T INTERVAL, ECG07: 380 MS
QRS DURATION, ECG06: 86 MS
QTC CALCULATION (BEZET), ECG08: 446 MS
RBC # BLD AUTO: 5.01 M/UL (ref 3.8–5.2)
SARS-COV-2, COV2: NOT DETECTED
SODIUM SERPL-SCNC: 142 MMOL/L (ref 136–145)
SOURCE, COVRS: NORMAL
SPECIMEN SOURCE, FCOV2M: NORMAL
TROPONIN I SERPL-MCNC: <0.05 NG/ML
VENTRICULAR RATE, ECG03: 83 BPM
WBC # BLD AUTO: 7.2 K/UL (ref 3.6–11)

## 2020-07-17 PROCEDURE — 99285 EMERGENCY DEPT VISIT HI MDM: CPT

## 2020-07-17 PROCEDURE — 94640 AIRWAY INHALATION TREATMENT: CPT

## 2020-07-17 PROCEDURE — 87635 SARS-COV-2 COVID-19 AMP PRB: CPT

## 2020-07-17 PROCEDURE — 85025 COMPLETE CBC W/AUTO DIFF WBC: CPT

## 2020-07-17 PROCEDURE — 93005 ELECTROCARDIOGRAM TRACING: CPT

## 2020-07-17 PROCEDURE — 36415 COLL VENOUS BLD VENIPUNCTURE: CPT

## 2020-07-17 PROCEDURE — 74011250637 HC RX REV CODE- 250/637: Performed by: EMERGENCY MEDICINE

## 2020-07-17 PROCEDURE — 84484 ASSAY OF TROPONIN QUANT: CPT

## 2020-07-17 PROCEDURE — 82550 ASSAY OF CK (CPK): CPT

## 2020-07-17 PROCEDURE — 80053 COMPREHEN METABOLIC PANEL: CPT

## 2020-07-17 PROCEDURE — 71045 X-RAY EXAM CHEST 1 VIEW: CPT

## 2020-07-17 RX ORDER — ALBUTEROL SULFATE 90 UG/1
2 AEROSOL, METERED RESPIRATORY (INHALATION)
Qty: 1 INHALER | Refills: 0 | Status: SHIPPED | OUTPATIENT
Start: 2020-07-17 | End: 2022-06-29

## 2020-07-17 RX ORDER — ALBUTEROL SULFATE 90 UG/1
6 AEROSOL, METERED RESPIRATORY (INHALATION)
Status: COMPLETED | OUTPATIENT
Start: 2020-07-17 | End: 2020-07-17

## 2020-07-17 RX ADMIN — ALBUTEROL SULFATE 6 PUFF: 90 AEROSOL, METERED RESPIRATORY (INHALATION) at 11:12

## 2020-07-17 NOTE — LETTER
7/18/2020 [address] [place] [address] [address] Dear Ms. Valentina, You were recently seen in the Emergency Department of [place] [address]. and had lab work performed. We would like to discuss these results with you. Please call the Emergency Department at your earliest convenience at [phone] between 10am-8pm to speak with one of our providers. Sincerely, SUMANTH Torres 
 
 
[facility] EMERGENCY DEPT 
[address] [place] [address]. 
[phone] Option #3

## 2020-07-17 NOTE — ED NOTES
Patient discharged by Dr. Jayna Austin. Patient provided with discharge instructions Rx and instructions on follow up care. Patient out of ED ambulatory accompanied by family.

## 2020-07-17 NOTE — ED PROVIDER NOTES
EMERGENCY DEPARTMENT HISTORY AND PHYSICAL EXAM      Date: 7/17/2020  Patient Name: Sury Odell    History of Presenting Illness     Chief Complaint   Patient presents with    Chest Pain     x yesterday    Shortness of Breath     x yesterday        History Provided By: Patient    HPI: Sury Odell, 44 y.o. female with PMHx as noted below presents the emergency department with complaints of chest pain, cough and shortness of breath. Patient yesterday she developed a productive cough with yellow sputum. Also has some mild dyspnea and chest pain with cough. Describes the pain as a dull ache worse with coughing. The pain does not radiate and is nonpleuritic. Patient also reports some associated lightheadedness today. Otherwise has had no fevers, chills, syncope, headaches, nausea, vomiting, diarrhea, abdominal pain. PCP: Gavino Prieto MD    Current Outpatient Medications   Medication Sig Dispense Refill    albuterol (PROVENTIL HFA, VENTOLIN HFA, PROAIR HFA) 90 mcg/actuation inhaler Take 2 Puffs by inhalation every four (4) hours as needed for Wheezing. 1 Inhaler 0    citalopram (CELEXA) 10 mg tablet Take 1 Tab by mouth daily. Indications: anxiousness associated with depression 30 Tab 0    cloNIDine HCL (CATAPRES) 0.1 mg tablet Take 1 Tab by mouth every six (6) hours as needed for Opioid Withdrawal. Indications: symptoms from stopping treatment with opioid drugs 7 Tab 0    hydrOXYzine HCL (ATARAX) 50 mg tablet Take 1 Tab by mouth three (3) times daily as needed for Anxiety. Indications: anxious 30 Tab 0       Past History     Past Medical History:  Past Medical History:   Diagnosis Date    Anxiety 9/20/2017    Depression 9/20/2017    Hepatitis C 9/20/2017    Treated; in remission    Migraine headache 9/20/2017    Sun-damaged skin     Sunburn, blistering     Tanning bed exposure        Past Surgical History:  History reviewed. No pertinent surgical history.     Family History:  Family History   Problem Relation Age of Onset    No Known Problems Mother     No Known Problems Father        Social History:  Social History     Tobacco Use    Smoking status: Never Smoker    Smokeless tobacco: Never Used   Substance Use Topics    Alcohol use: Yes    Drug use: Yes     Types: Cocaine, Prescription, Heroin       Allergies: Allergies   Allergen Reactions    Sulfa (Sulfonamide Antibiotics) Hives         Review of Systems   Review of Systems  Constitutional: Negative for fever, chills, and fatigue. HENT: Negative for congestion, sore throat, rhinorrhea, sneezing and neck stiffness   Eyes: Negative for discharge and redness. Respiratory: Positive for  shortness of breath, wheezing   Cardiovascular: Positive for chest pain. Negative palpitations   Gastrointestinal: Negative for nausea, vomiting, abdominal pain, constipation, diarrhea and blood in stool. Genitourinary: Negative for dysuria, hematuria, flank pain, decreased urine volume, discharge,   Musculoskeletal: Negative for myalgias or joint pain . Skin: Negative for rash or lesions . Neurological: Positive lightheadedness. Negative weakness, l numbness and headaches. Physical Exam   Physical Exam    GENERAL: alert and oriented, no acute distress  EYES: PEERL, No injection, discharge or icterus. ENT: Mucous membranes pink and moist.  NECK: Supple  LUNGS: Airway patent. Non-labored respirations. Faint expiratory wheezes noted bilaterally. HEART: Regular rate and rhythm. No peripheral edema  ABDOMEN: Non-distended and non-tender, without guarding or rebound.   SKIN:  warm, dry  MSK/EXTREMITIES: Without swelling, tenderness or deformity, symmetric with normal ROM  NEUROLOGICAL: Alert, oriented      Diagnostic Study Results     Labs -     Recent Results (from the past 12 hour(s))   EKG, 12 LEAD, INITIAL    Collection Time: 07/17/20  9:51 AM   Result Value Ref Range    Ventricular Rate 83 BPM    Atrial Rate 83 BPM P-R Interval 130 ms    QRS Duration 86 ms    Q-T Interval 380 ms    QTC Calculation (Bezet) 446 ms    Calculated P Axis 64 degrees    Calculated R Axis 63 degrees    Calculated T Axis 39 degrees    Diagnosis       Normal sinus rhythm  When compared with ECG of 14-JUN-2017 18:44,  No significant change was found  Confirmed by Deep Luna MD ([id number]) on 7/17/2020 10:49:22 AM     CBC WITH AUTOMATED DIFF    Collection Time: 07/17/20 10:10 AM   Result Value Ref Range    WBC 7.2 3.6 - 11.0 K/uL    RBC 5.01 3.80 - 5.20 M/uL    HGB 14.8 11.5 - 16.0 g/dL    HCT 46.7 35.0 - 47.0 %    MCV 93.2 80.0 - 99.0 FL    MCH 29.5 26.0 - 34.0 PG    MCHC 31.7 30.0 - 36.5 g/dL    RDW 12.8 11.5 - 14.5 %    PLATELET 187 141 - 617 K/uL    MPV 11.3 8.9 - 12.9 FL    NRBC 0.0 0  WBC    ABSOLUTE NRBC 0.00 0.00 - 0.01 K/uL    NEUTROPHILS 79 (H) 32 - 75 %    LYMPHOCYTES 15 12 - 49 %    MONOCYTES 5 5 - 13 %    EOSINOPHILS 1 0 - 7 %    BASOPHILS 0 0 - 1 %    IMMATURE GRANULOCYTES 0 0.0 - 0.5 %    ABS. NEUTROPHILS 5.6 1.8 - 8.0 K/UL    ABS. LYMPHOCYTES 1.1 0.8 - 3.5 K/UL    ABS. MONOCYTES 0.3 0.0 - 1.0 K/UL    ABS. EOSINOPHILS 0.1 0.0 - 0.4 K/UL    ABS. BASOPHILS 0.0 0.0 - 0.1 K/UL    ABS. IMM. GRANS. 0.0 0.00 - 0.04 K/UL    DF AUTOMATED     METABOLIC PANEL, COMPREHENSIVE    Collection Time: 07/17/20 11:11 AM   Result Value Ref Range    Sodium 142 136 - 145 mmol/L    Potassium 3.8 3.5 - 5.1 mmol/L    Chloride 108 97 - 108 mmol/L    CO2 28 21 - 32 mmol/L    Anion gap 6 5 - 15 mmol/L    Glucose 95 65 - 100 mg/dL    BUN 7 6 - 20 MG/DL    Creatinine 0.55 0.55 - 1.02 MG/DL    BUN/Creatinine ratio 13 12 - 20      GFR est AA >60 >60 ml/min/1.73m2    GFR est non-AA >60 >60 ml/min/1.73m2    Calcium 8.3 (L) 8.5 - 10.1 MG/DL    Bilirubin, total 0.5 0.2 - 1.0 MG/DL    ALT (SGPT) 18 12 - 78 U/L    AST (SGOT) 15 15 - 37 U/L    Alk.  phosphatase 39 (L) 45 - 117 U/L    Protein, total 6.5 6.4 - 8.2 g/dL    Albumin 3.3 (L) 3.5 - 5.0 g/dL    Globulin 3.2 2.0 - 4.0 g/dL    A-G Ratio 1.0 (L) 1.1 - 2.2     TROPONIN I    Collection Time: 07/17/20 11:11 AM   Result Value Ref Range    Troponin-I, Qt. <0.05 <0.05 ng/mL   CK W/ REFLX CKMB    Collection Time: 07/17/20 11:11 AM   Result Value Ref Range    CK 48 26 - 192 U/L   SARS-COV-2    Collection Time: 07/17/20 11:52 AM   Result Value Ref Range    Specimen source Nasopharyngeal      SARS-CoV-2 PENDING     Specimen source Nasopharyngeal         Radiologic Studies -   XR CHEST PORT   Final Result   Impression: No acute process. CT Results  (Last 48 hours)    None        CXR Results  (Last 48 hours)               07/17/20 1001  XR CHEST PORT Final result    Impression:  Impression: No acute process. Narrative: Indication: Chest pain       Comparison: 6/16/2020       Portable exam of the chest obtained at 952 demonstrates normal heart size. There   is no acute process in the lung fields. The osseous structures are unremarkable. Medical Decision Making   I, Bobo Hsu MD am the first provider for this patient and am the attending of record for this patient encounter. I reviewed the vital signs, available nursing notes, past medical history, past surgical history, family history and social history. Vital Signs-Reviewed the patient's vital signs.   Patient Vitals for the past 12 hrs:   Temp Pulse Resp BP SpO2   07/17/20 1400    131/77    07/17/20 1345  88 13 110/60 96 %   07/17/20 1330  89 14 107/66 96 %   07/17/20 1315  96 17 123/71 97 %   07/17/20 1300  (!) 105 17 123/70 96 %   07/17/20 1245  91 15 115/63 98 %   07/17/20 1215  96 16 119/62 97 %   07/17/20 1145  98 18 125/63 98 %   07/17/20 1115  (!) 104 14 128/70 100 %   07/17/20 1112     99 %   07/17/20 1045  81 14 119/63 98 %   07/17/20 1017  81 16 126/67 99 %   07/17/20 0959     98 %   07/17/20 0945 97.6 °F (36.4 °C) 80 18 140/67 99 %       EKG interpretation: (Preliminary)  Rhythm: normal sinus rhythm; and regular . Rate (approx.): 83; Axis: normal; P wave: normal; QRS interval: normal ; ST/T wave: normal;  was interpreted by Ayaan Hernandez MD,ED Provider. Records Reviewed: Nursing Notes and Old Medical Records    Provider Notes (Medical Decision Making): On presentation, the patient is well appearing, in no acute distress with reassuring vital signs. Based on my history and exam the differential diagnosis for this patient includes COVID 19, URI, sinusitis, bronchitis. Nothing to suggest strep pharyngitis, PNA or bacterial sinusitis. Given her infectious symptoms feel that ACS pulmonary embolism would be less likely. Patient may have COVID 19, however is breathing comfortably, maintaining adequate O2 sats on room air and is overall well appearing so does not require admission at this time. Breathing improved after albuterol MDI. Trish Mack Have recommended self quarantine per CDC guidelines. Symptomatic therapy suggested. Should return immediately to the emergency department develops shortness of breath, confusion, weakness or any othe new/worrisome symptoms       ED Course:   Initial assessment performed. The patients presenting problems have been discussed, and they are in agreement with the care plan formulated and outlined with them. I have encouraged them to ask questions as they arise throughout their visit. PROGRESS  Hemanthalthea Sanabria's  results have been reviewed with her. She has been counseled regarding her diagnosis. She verbally conveys understanding and agreement of the signs, symptoms, diagnosis, treatment and prognosis and additionally agrees to follow up as recommended with Dr. Molly Loja MD in 24 - 48 hours. She also agrees with the care-plan and conveys that all of her questions have been answered.   I have also put together some discharge instructions for her that include: 1) educational information regarding their diagnosis, 2) how to care for their diagnosis at home, as well a 3) list of reasons why they would want to return to the ED prior to their follow-up appointment, should their condition change. Disposition:  home    PLAN:  1. Discharge Medication List as of 7/17/2020  1:34 PM      START taking these medications    Details   albuterol (PROVENTIL HFA, VENTOLIN HFA, PROAIR HFA) 90 mcg/actuation inhaler Take 2 Puffs by inhalation every four (4) hours as needed for Wheezing., Normal, Disp-1 Inhaler,R-0         CONTINUE these medications which have NOT CHANGED    Details   citalopram (CELEXA) 10 mg tablet Take 1 Tab by mouth daily. Indications: anxiousness associated with depression, Normal, Disp-30 Tab, R-0      cloNIDine HCL (CATAPRES) 0.1 mg tablet Take 1 Tab by mouth every six (6) hours as needed for Opioid Withdrawal. Indications: symptoms from stopping treatment with opioid drugs, Normal, Disp-7 Tab, R-0      hydrOXYzine HCL (ATARAX) 50 mg tablet Take 1 Tab by mouth three (3) times daily as needed for Anxiety. Indications: anxious, Normal, Disp-30 Tab, R-0           2. Follow-up Information     Follow up With Specialties Details Why Contact Info    Buck Huntley MD Internal Medicine Schedule an appointment as soon as possible for a visit in 2 days  [address]  [address] [address] [phone]      [facility] EMERGENCY DEPT Emergency Medicine  If symptoms worsen [address]  [address] [facility]  [phone]        Return to ED if worse     Diagnosis     Clinical Impression:   1. Lower respiratory infection    2. Chest pain on breathing        Please note that this dictation was completed with Dragon, computer voice recognition software. Quite often unanticipated grammatical, syntax, homophones, and other interpretive errors are inadvertently transcribed by the computer software. Please disregard these errors. Additionally, please excuse any errors that have escaped final proofreading.

## 2020-07-17 NOTE — ED NOTES
Pt noted to be very drowsy. When asked if she took anything this morning she reported that she \"took one Seroquel\". Pt denies taking any other medications or substances.

## 2020-07-17 NOTE — DISCHARGE INSTRUCTIONS
Patient Education   Learning About Coronavirus [phone])  Coronavirus [phone]): OverviewPatient Education        Bronchitis: Care Instructions  Your Care Instructions     Bronchitis is inflammation of the bronchial tubes, which carry air to the lungs. The tubes swell and produce mucus, or phlegm. The mucus and inflamed bronchial tubes make you cough. You may have trouble breathing. Most cases of bronchitis are caused by viruses like those that cause colds. Antibiotics usually do not help and they may be harmful. Bronchitis usually develops rapidly and lasts about 2 to 3 weeks in otherwise healthy people. Follow-up care is a key part of your treatment and safety. Be sure to make and go to all appointments, and call your doctor if you are having problems. It's also a good idea to know your test results and keep a list of the medicines you take. How can you care for yourself at home? · Take all medicines exactly as prescribed. Call your doctor if you think you are having a problem with your medicine. · Get some extra rest.  · Take an over-the-counter pain medicine, such as acetaminophen (Tylenol), ibuprofen (Advil, Motrin), or naproxen (Aleve) to reduce fever and relieve body aches. Read and follow all instructions on the label. · Do not take two or more pain medicines at the same time unless the doctor told you to. Many pain medicines have acetaminophen, which is Tylenol. Too much acetaminophen (Tylenol) can be harmful. · Take an over-the-counter cough medicine that contains dextromethorphan to help quiet a dry, hacking cough so that you can sleep. Avoid cough medicines that have more than one active ingredient. Read and follow all instructions on the label. · Breathe moist air from a humidifier, hot shower, or sink filled with hot water. The heat and moisture will thin mucus so you can cough it out. · Do not smoke. Smoking can make bronchitis worse.  If you need help quitting, talk to your doctor about stop-smoking programs and medicines. These can increase your chances of quitting for good. When should you call for help? [id number] anytime you think you may need emergency care. For example, call if:  · You have severe trouble breathing. Call your doctor now or seek immediate medical care if:  · You have new or worse trouble breathing. · You cough up dark brown or bloody mucus (sputum). · You have a new or higher fever. · You have a new rash. Watch closely for changes in your health, and be sure to contact your doctor if:  · You cough more deeply or more often, especially if you notice more mucus or a change in the color of your mucus. · You are not getting better as expected. Where can you learn more? Go to http://jorgeTouch Bionicskeerthi.info/  Enter H333 in the search box to learn more about \"Bronchitis: Care Instructions. \"  Current as of: February 24, 2020               Content Version: 12.5  © 5792-2613 [employer]. Care instructions adapted under license by [employer] (which disclaims liability or warranty for this information). If you have questions about a medical condition or this instruction, always ask your healthcare professional. [place] [address] any warranty or liability for your use of this information. What is coronavirus ([id number])? The coronavirus disease (COVID-19) is caused by a virus. It is an illness that was first found in [place], [place], in December 2019. It has since spread worldwide. The virus can cause fever, cough, and trouble breathing. In severe cases, it can cause pneumonia and make it hard to breathe without help. It can cause death. Coronaviruses are a large group of viruses. They cause the common cold. They also cause more serious illnesses like Middle East respiratory syndrome (MERS) and severe acute respiratory syndrome (SARS). COVID-19 is caused by a novel coronavirus.  That means it's a new type that has not been seen in people before. This virus spreads person-to-person through droplets from coughing and sneezing. It can also spread when you are close to someone who is infected. And it can spread when you touch something that has the virus on it, such as a doorknob or a tabletop. What can you do to protect yourself from coronavirus (COVID-19)? The best way to protect yourself from getting sick is to:  · Avoid areas where there is an outbreak. · Avoid contact with people who may be infected. · Wash your hands often with soap or alcohol-based hand sanitizers. · Avoid crowds and try to stay at least 6 feet away from other people. · Wash your hands often, especially after you cough or sneeze. Use soap and water, and scrub for at least 20 seconds. If soap and water aren't available, use an alcohol-based hand . · Avoid touching your mouth, nose, and eyes. What can you do to avoid spreading the virus to others? To help avoid spreading the virus to others:  · Cover your mouth with a tissue when you cough or sneeze. Then throw the tissue in the trash. · Use a disinfectant to clean things that you touch often. · Stay home if you are sick or have been exposed to the virus. Don't go to school, work, or public areas. And don't use public transportation. · If you are sick:  ? Leave your home only if you need to get medical care. But call the doctor's office first so they know you're coming. And wear a face mask, if you have one.  ? If you have a face mask, wear it whenever you're around other people. It can help stop the spread of the virus when you cough or sneeze. ? Clean and disinfect your home every day. Use household  and disinfectant wipes or sprays. Take special care to clean things that you grab with your hands. These include doorknobs, remote controls, phones, and handles on your refrigerator and microwave. And don't forget countertops, tabletops, bathrooms, and computer keyboards.   When to call for help  Call 911 anytime you think you may need emergency care. For example, call if:  · You have severe trouble breathing. (You can't talk at all.)  · You have constant chest pain or pressure. · You are severely dizzy or lightheaded. · You are confused or can't think clearly. · Your face and lips have a blue color. · You pass out (lose consciousness) or are very hard to wake up. Call your doctor now if you develop symptoms such as:  · Shortness of breath. · Fever. · Cough. If you need to get care, call ahead to the doctor's office for instructions before you go. Make sure you wear a face mask, if you have one, to prevent exposing other people to the virus. Where can you get the latest information? The following health organizations are tracking and studying this virus. Their websites contain the most up-to-date information. [place] [address] also learn what to do if you think you may have been exposed to the virus. · U.S. Centers for Disease Control and Prevention (CDC): The CDC provides updated news about the disease and travel advice. The website also tells you how to prevent the spread of infection. www.cdc.gov  · World Health Organization [facility]): WHO offers information about the virus outbreaks. WHO also has travel advice. www.who.int  Current as of: April 1, 2020               Content Version: 12.4  © 7889-4443 [employer]. Care instructions adapted under license by your healthcare professional. If you have questions about a medical condition or this instruction, always ask your healthcare professional. [address] any warranty or liability for your use of this information.

## 2020-07-17 NOTE — ED NOTES
Pt arrives to ED via EMS with c/o SOB and left shoulder pain that radiates to her back that started last night and got progressively worse this morning. Pt reports feeling lightheaded and feeling like she is going to \"pass out\".

## 2020-07-20 ENCOUNTER — PATIENT OUTREACH (OUTPATIENT)
Dept: CASE MANAGEMENT | Age: 50
End: 2020-07-20

## 2020-07-21 ENCOUNTER — PATIENT OUTREACH (OUTPATIENT)
Dept: CASE MANAGEMENT | Age: 50
End: 2020-07-21

## 2021-02-05 ENCOUNTER — TELEPHONE (OUTPATIENT)
Dept: INTERNAL MEDICINE CLINIC | Age: 51
End: 2021-02-05

## 2021-02-05 NOTE — TELEPHONE ENCOUNTER
Patient [place] of [place] and would like to reestablish as a patient. Her mom was Vania.      [phone]

## 2022-03-19 PROBLEM — B19.20 HEPATITIS C: Status: ACTIVE | Noted: 2017-09-20

## 2022-03-19 PROBLEM — F32.A DEPRESSION: Status: ACTIVE | Noted: 2017-09-20

## 2022-03-19 PROBLEM — F19.10 SUBSTANCE ABUSE (HCC): Status: ACTIVE | Noted: 2020-06-17

## 2022-03-19 PROBLEM — F41.9 ANXIETY: Status: ACTIVE | Noted: 2017-09-20

## 2022-03-20 PROBLEM — G43.909 MIGRAINE HEADACHE: Status: ACTIVE | Noted: 2017-09-20

## 2022-06-29 ENCOUNTER — HOSPITAL ENCOUNTER (EMERGENCY)
Age: 52
Discharge: HOME | End: 2022-06-29
Attending: EMERGENCY MEDICINE
Payer: COMMERCIAL

## 2022-06-29 VITALS
SYSTOLIC BLOOD PRESSURE: 150 MMHG | RESPIRATION RATE: 20 BRPM | HEART RATE: 95 BPM | TEMPERATURE: 98 F | DIASTOLIC BLOOD PRESSURE: 65 MMHG | OXYGEN SATURATION: 97 %

## 2022-06-29 DIAGNOSIS — F41.1 ANXIETY STATE: Primary | ICD-10-CM

## 2022-06-29 LAB
ALBUMIN SERPL-MCNC: 3.7 G/DL (ref 3.5–5)
ALBUMIN/GLOB SERPL: 1.1 {RATIO} (ref 1.1–2.2)
ALP SERPL-CCNC: 51 U/L (ref 45–117)
ALT SERPL-CCNC: 18 U/L (ref 12–78)
AMPHET UR QL SCN: POSITIVE
ANION GAP SERPL CALC-SCNC: 6 MMOL/L (ref 5–15)
APAP SERPL-MCNC: <2 UG/ML (ref 10–30)
APPEARANCE UR: ABNORMAL
AST SERPL-CCNC: 19 U/L (ref 15–37)
BACTERIA URNS QL MICRO: ABNORMAL /HPF
BARBITURATES UR QL SCN: NEGATIVE
BASOPHILS # BLD: 0 K/UL (ref 0–0.1)
BASOPHILS NFR BLD: 0 % (ref 0–1)
BENZODIAZ UR QL: NEGATIVE
BILIRUB SERPL-MCNC: 0.4 MG/DL (ref 0.2–1)
BILIRUB UR QL: NEGATIVE
BUN SERPL-MCNC: 10 MG/DL (ref 6–20)
BUN/CREAT SERPL: 19 (ref 12–20)
CALCIUM SERPL-MCNC: 9.2 MG/DL (ref 8.5–10.1)
CANNABINOIDS UR QL SCN: NEGATIVE
CHLORIDE SERPL-SCNC: 106 MMOL/L (ref 97–108)
CO2 SERPL-SCNC: 25 MMOL/L (ref 21–32)
COCAINE UR QL SCN: POSITIVE
COLOR UR: ABNORMAL
COMMENT, HOLDF: NORMAL
CREAT SERPL-MCNC: 0.53 MG/DL (ref 0.55–1.02)
DIFFERENTIAL METHOD BLD: NORMAL
DRUG SCRN COMMENT,DRGCM: ABNORMAL
EOSINOPHIL # BLD: 0.2 K/UL (ref 0–0.4)
EOSINOPHIL NFR BLD: 2 % (ref 0–7)
EPITH CASTS URNS QL MICRO: ABNORMAL /LPF
ERYTHROCYTE [DISTWIDTH] IN BLOOD BY AUTOMATED COUNT: 12 % (ref 11.5–14.5)
GLOBULIN SER CALC-MCNC: 3.5 G/DL (ref 2–4)
GLUCOSE SERPL-MCNC: 93 MG/DL (ref 65–100)
GLUCOSE UR STRIP.AUTO-MCNC: NEGATIVE MG/DL
HCG UR QL: NEGATIVE
HCT VFR BLD AUTO: 42.1 % (ref 35–47)
HGB BLD-MCNC: 14.1 G/DL (ref 11.5–16)
HGB UR QL STRIP: NEGATIVE
HYALINE CASTS URNS QL MICRO: ABNORMAL /LPF (ref 0–5)
IMM GRANULOCYTES # BLD AUTO: 0 K/UL (ref 0–0.04)
IMM GRANULOCYTES NFR BLD AUTO: 0 % (ref 0–0.5)
KETONES UR QL STRIP.AUTO: NEGATIVE MG/DL
LEUKOCYTE ESTERASE UR QL STRIP.AUTO: NEGATIVE
LYMPHOCYTES # BLD: 2.1 K/UL (ref 0.8–3.5)
LYMPHOCYTES NFR BLD: 24 % (ref 12–49)
MCH RBC QN AUTO: 30.1 PG (ref 26–34)
MCHC RBC AUTO-ENTMCNC: 33.5 G/DL (ref 30–36.5)
MCV RBC AUTO: 90 FL (ref 80–99)
METHADONE UR QL: NEGATIVE
MONOCYTES # BLD: 0.6 K/UL (ref 0–1)
MONOCYTES NFR BLD: 6 % (ref 5–13)
NEUTS SEG # BLD: 5.9 K/UL (ref 1.8–8)
NEUTS SEG NFR BLD: 68 % (ref 32–75)
NITRITE UR QL STRIP.AUTO: NEGATIVE
NRBC # BLD: 0 K/UL (ref 0–0.01)
NRBC BLD-RTO: 0 PER 100 WBC
OPIATES UR QL: NEGATIVE
PCP UR QL: NEGATIVE
PH UR STRIP: 8 [PH] (ref 5–8)
PLATELET # BLD AUTO: 257 K/UL (ref 150–400)
PMV BLD AUTO: 10.6 FL (ref 8.9–12.9)
POTASSIUM SERPL-SCNC: 3.6 MMOL/L (ref 3.5–5.1)
PROT SERPL-MCNC: 7.2 G/DL (ref 6.4–8.2)
PROT UR STRIP-MCNC: ABNORMAL MG/DL
RBC # BLD AUTO: 4.68 M/UL (ref 3.8–5.2)
RBC #/AREA URNS HPF: ABNORMAL /HPF (ref 0–5)
SALICYLATES SERPL-MCNC: <1.7 MG/DL (ref 2.8–20)
SAMPLES BEING HELD,HOLD: NORMAL
SODIUM SERPL-SCNC: 137 MMOL/L (ref 136–145)
SP GR UR REFRACTOMETRY: 1.02 (ref 1–1.03)
TSH SERPL DL<=0.05 MIU/L-ACNC: 1.92 UIU/ML (ref 0.36–3.74)
UROBILINOGEN UR QL STRIP.AUTO: 0.2 EU/DL (ref 0.2–1)
WBC # BLD AUTO: 8.8 K/UL (ref 3.6–11)
WBC URNS QL MICRO: ABNORMAL /HPF (ref 0–4)

## 2022-06-29 PROCEDURE — 80179 DRUG ASSAY SALICYLATE: CPT

## 2022-06-29 PROCEDURE — 99283 EMERGENCY DEPT VISIT LOW MDM: CPT

## 2022-06-29 PROCEDURE — 36415 COLL VENOUS BLD VENIPUNCTURE: CPT

## 2022-06-29 PROCEDURE — 81025 URINE PREGNANCY TEST: CPT

## 2022-06-29 PROCEDURE — 85025 COMPLETE CBC W/AUTO DIFF WBC: CPT

## 2022-06-29 PROCEDURE — 80053 COMPREHEN METABOLIC PANEL: CPT

## 2022-06-29 PROCEDURE — 81001 URINALYSIS AUTO W/SCOPE: CPT

## 2022-06-29 PROCEDURE — 80307 DRUG TEST PRSMV CHEM ANLYZR: CPT

## 2022-06-29 PROCEDURE — 84443 ASSAY THYROID STIM HORMONE: CPT

## 2022-06-29 PROCEDURE — 80143 DRUG ASSAY ACETAMINOPHEN: CPT

## 2022-06-29 RX ORDER — BUPRENORPHINE AND NALOXONE 8; 2 MG/1; MG/1
1 FILM, SOLUBLE BUCCAL; SUBLINGUAL DAILY
COMMUNITY

## 2022-06-29 RX ORDER — FLUOXETINE HYDROCHLORIDE 20 MG/1
20 CAPSULE ORAL DAILY
COMMUNITY
Start: 2022-05-23 | End: 2022-06-29

## 2022-06-29 RX ORDER — TOPIRAMATE 50 MG/1
75 TABLET, FILM COATED ORAL 2 TIMES DAILY
COMMUNITY
Start: 2022-03-24

## 2022-06-29 NOTE — BSMART NOTE
Writer provided resource for [employer] and completed CREST referral with [facility]. Patient was also encouraged to contact motivate to get earlier appointment. Charge nurse [facility], [facility] and [facility] were informed of disposition.

## 2022-06-29 NOTE — ED PROVIDER NOTES
Patient is a 42-year-old female with past medical history of anxiety, depression, migraines who presents for evaluation of anxiety, hallucinations. She reports that over the past year and a half, she has been taking Prozac for anxiety and depression. She additionally notes that at this time, she got clean from drugs and was placed on Suboxone. She does follow-up at the [facility] clinic. Approximately 3 weeks ago, she took her self off of her Prozac because she felt that she was doing much better and likely did not require medication anymore. Approximately 3 to 4 days ago, she began feeling extremely anxious and has been having intermittent panic attacks. Her son is with her and endorses that she has been having auditory and visual hallucinations. He notes that she has been talking to people who are not there. At night, he has found her wandering outside looking for people who are not there. The patient additionally notes that she is going through marital problems with her . She denies any abuse. Denies any suicidal or homicidal ideations. Past Medical History:   Diagnosis Date    Anxiety 9/20/2017    Depression 9/20/2017    Hepatitis C 9/20/2017    Treated; in remission    Migraine headache 9/20/2017    Sun-damaged skin     Sunburn, blistering     Tanning bed exposure        No past surgical history on file. Family History:   Problem Relation Age of Onset    No Known Problems Mother     No Known Problems Father        Social History     Socioeconomic History    Marital status: SINGLE     Spouse name: Not on file    Number of children: Not on file    Years of education: Not on file    Highest education level: Not on file   Occupational History    Not on file   Tobacco Use    Smoking status: Never Smoker    Smokeless tobacco: Never Used   Substance and Sexual Activity    Alcohol use:  Yes    Drug use: Yes     Types: Cocaine, Prescription, Heroin    Sexual activity: Yes     Partners: Male   Other Topics Concern     Service Not Asked    Blood Transfusions Not Asked    Caffeine Concern Not Asked    Occupational Exposure Not Asked    Hobby Hazards Not Asked    Sleep Concern Not Asked    Stress Concern Not Asked    Weight Concern Not Asked    Special Diet Not Asked    Back Care Not Asked    Exercise Not Asked    Bike Helmet Not Asked   [address] Not Asked    Self-Exams Not Asked   Social History Narrative    Not on file     Social Determinants of Health     Financial Resource Strain:     Difficulty of Paying Living Expenses: Not on file   Food Insecurity:     Worried About Running Out of Food in the Last Year: Not on file    Michelle of Food in the Last Year: Not on file   Transportation Needs:     Lack of Transportation (Medical): Not on file    Lack of Transportation (Non-Medical): Not on file   Physical Activity:     Days of Exercise per Week: Not on file    Minutes of Exercise per Session: Not on file   Stress:     Feeling of Stress : Not on file   Social Connections:     Frequency of Communication with Friends and Family: Not on file    Frequency of Social Gatherings with Friends and Family: Not on file    Attends [employer] Services: Not on file    Active Member of [address] [place] [address] [employer] or Organizations: Not on file    Attends Club or Organization Meetings: Not on file    Marital Status: Not on file   Intimate Partner Violence:     Fear of Current or Ex-Partner: Not on file    Emotionally Abused: Not on file    Physically Abused: Not on file    Sexually Abused: Not on file   Housing Stability:     Unable to Pay for Housing in the Last Year: Not on file    Number of [place] in the Last Year: Not on file    Unstable Housing in the Last Year: Not on file         ALLERGIES: Sulfa (sulfonamide antibiotics)    Review of Systems   Constitutional: Negative for unexpected weight change. HENT: Negative for congestion.     Eyes: Negative for visual disturbance. Respiratory: Negative for cough, chest tightness and shortness of breath. Cardiovascular: Negative for chest pain. Gastrointestinal: Negative for abdominal pain, nausea and vomiting. Endocrine: Negative for polyuria. Genitourinary: Negative for dysuria and flank pain. Musculoskeletal: Negative for back pain. Skin: Negative for color change. Allergic/Immunologic: Negative for immunocompromised state. Neurological: Negative for dizziness and headaches. Hematological: Negative for adenopathy. Psychiatric/Behavioral: Negative for agitation. Vitals:    06/29/22 1415   BP: (!) 154/95   Pulse: 78   Resp: 18   Temp: 97.9 °F (36.6 °C)   SpO2: 96%            Physical Exam  Vitals and nursing note reviewed. Constitutional:       Appearance: Normal appearance. She is normal weight. HENT:      Head: Atraumatic. Eyes:      Conjunctiva/sclera: Conjunctivae normal.      Pupils: Pupils are equal, round, and reactive to light. Cardiovascular:      Rate and Rhythm: Normal rate. Pulmonary:      Effort: Pulmonary effort is normal. No respiratory distress. Abdominal:      General: Abdomen is flat. Musculoskeletal:         General: Normal range of motion. Cervical back: Neck supple. Skin:     General: Skin is warm and dry. Capillary Refill: Capillary refill takes less than 2 seconds. Neurological:      General: No focal deficit present. Mental Status: She is alert and oriented to person, place, and time. Mental status is at baseline. Psychiatric:         Attention and Perception: Attention normal.         Mood and Affect: Mood is anxious. Speech: Speech normal.         Behavior: Behavior normal. Behavior is cooperative. Thought Content: Thought content normal. Thought content is not paranoid. Thought content does not include homicidal or suicidal ideation.           MDM  Number of Diagnoses or Management Options  Anxiety state  Diagnosis management comments: Presenting with anxiety, possible hallucinations. Labs unremarkable. Urine drug screen is positive for cocaine and amphetamines. Patient does admit recent relapse due to stressor of has been cheating on her. This may be contributing to her symptoms. Bsmart evaluated patient and deemed no suicide risk. Consulted on-call psychiatrist, Dr. Jia Moreno who stated patient is safe discharge with outpatient follow-up. Patient given resources on [facility], [facility].  She does have a follow-up with her nurse practitioner early next week at [facility]. Return for worsening symptoms, suicidal ideation, homicidal ideation. Discussed my clinical impression(s), any labs and/or radiology results with the patient. I answered any questions and addressed any concerns. Discussed the importance of following up with their primary care physician and/or specialist(s). Discussed signs or symptoms that would warrant return back to the ER for further evaluation. The patient is agreeable with discharge.        Amount and/or Complexity of Data Reviewed  Clinical lab tests: ordered and reviewed    Patient Progress  Patient progress: stable         Procedures

## 2022-06-29 NOTE — BSMART NOTE
Comprehensive Assessment Form Part 1      Section I - Disposition    DDX: Unspecified Psychosis     The Medical Doctor to Psychiatrist conference was not completed. The Medical Doctor requested writer consult with on call. Writer consulted with on [address] who notes that patient is not a risk to self of other and recommended that patient be discharged with resources. The plan is for patient to be discharged with resources for [facility] and CREST referral  The on-call Psychiatrist consulted was Dr. Ivonne Delvalle.  The admitting Psychiatrist will be Dr. Jelly Gil  The admitting Diagnosis is N/A  The Payor source is [place]. Past Medical History:   Diagnosis Date    Anxiety 9/20/2017    Depression 9/20/2017    Hepatitis C 9/20/2017    Treated; in remission    Migraine headache 9/20/2017    Sun-damaged skin     Sunburn, blistering     Tanning bed exposure      This writer reviewed the [place] Suicide Severity Rating Scale in nursing flowsheet and the risk level assigned is no risk. Based on this assessment, the risk of suicide is no risk and the plan is for patient to discharge with resources. Section II - Integrated Summary  Summary:  Patient is a 46 y.o female who self presented to ER with the support of her son and her father. Patient was evaluated in ER 15, dressed in green gown, and sitter outside of door. Patient reports that she stopped taking Prozac and Topamax about 3 weeks ago. \"I was feeling so good that I didn't think I needed it. \" Patient denies any history of MH, however reports CARLIN with heroin and cocaine as her drug of choice. Patient reports that she currently participates in medication assistance program with [facility]. She reports that she also sees a therapist there and sees a resident for medication. Patient reports that she currently sees resident every month, and has next appointment on Monday.  Patient reports recent stressors, \"new job with additional roles and finding out that  has been cheating on her. Patient acknowledges that she has not been sleeping well, however appetite is fine. She also acknowledges recent relapse on cocaine, last week, after 1 1/2 years clean. Per patient son patient was witnessed by her father of not sleeping, asking him to look outside for people and hearing things. Son notes that he arrived in town on yesterday and was asked to go around the house to look for people who were not there. Patient tries to explain reported observations and notes that she and her father were in the basement and she could hear the people upstairs talking. At this time the patient does not meet critera for inpatient admission and [facility] by [facility]. The patienthas demonstrated mental capacity to provide informed consent. The information is given by the patient and son. The Chief Complaint is \"I found out my  was cheating on me, I got really upset\". The Precipitant Factors are stress. Previous Hospitalizations: none reported  The patient has not previously been in restraints. Current Psychiatrist and/or  is unknown. Lethality Assessment:    The potential for suicide noted by the following patient currently denies SI and scored no risk on [place] . The potential for homicide is not noted. The patient has not been a perpetrator of sexual or physical abuse. There are not pending charges. The patient is not felt to be at risk for self harm or harm to others. The attending nurse was advised that security has not been notified. Section III - Psychosocial  The patient's overall mood and attitude is calm and cooperative. Feelings of helplessness and hopelessness are not observed. Generalized anxiety is not observed. Panic is not observed. Phobias are not observed. Obsessive compulsive tendencies are not observed.       Section IV - Mental Status Exam  The patient's appearance shows no evidence of impairment. The patient's behavior shows no evidence of impairment. The patient is oriented to time, place, person and situation. The patient's speech shows no evidence of impairment. The patient's mood is anxious. The range of affect shows no evidence of impairment. The patient's thought content demonstrates no evidence of impairment. The thought process shows no evidence of impairment. The patient's perception shows no evidence of impairment. The patient's memory shows no evidence of impairment. The patient's appetite shows no evidence of impairment. The patient reports that she has struggled with sleep for the past 2 days}. The patient shows some insight. The patient's judgement shows no evidence of impairment. Section V - Substance Abuse  The patient is using substances. The patient is using cocaine by inhalation for unknown with last use on last week. Section VI - Living Arrangements  The patient is . The patient lives with her father. The patient has one adult son. The patient does plan to return home upon discharge. The patient does not have legal issues pending. The patient's source of income comes from employment. Synagogue and cultural practices have not been voiced at this time. The patient's greatest support comes from family and this person will be involved with the treatment. The patient has not been in an event described as horrible or outside the realm of ordinary life experience either currently or in the past.  The patient has not been a victim of sexual/physical abuse. Section VII - Other Areas of Clinical Concern  The highest grade achieved is unknown . The patient is currently employed and speaks [place] as a primary language. The patient has no communication impairments affecting communication. The patient's preference for learning can be described as: can read and write adequately.   The patient's hearing is normal.  The patient's vision is normal.      Mariela Valdez, Resident in Counseling

## 2022-06-29 NOTE — ED TRIAGE NOTES
Pt stopped taking prozac 3 weeks ago. Last few days pt has had \"frantic behavior\", anxiety, depression. Per son, pt has \"visual and auditory hallucinations\". Pt denies. Denies SI/HI. Denies weapons or recreational drugs on her. Does have presription topomax and fluoxetine as well as suboxone strips.

## 2022-07-05 NOTE — BSMART NOTE
BSMART assessment completed, and suicide risk level noted to be . Charge Nurse Dana Craig and Physician Sarah Feldman notified. Concerns not observed. Security/Off- has not been notified.

## 2023-03-09 ENCOUNTER — APPOINTMENT (OUTPATIENT)
Dept: GENERAL RADIOLOGY | Age: 53
End: 2023-03-09
Attending: EMERGENCY MEDICINE
Payer: COMMERCIAL

## 2023-03-09 ENCOUNTER — HOSPITAL ENCOUNTER (EMERGENCY)
Age: 53
Discharge: LEFT BEFORE DISCHARGE | End: 2023-03-10
Payer: COMMERCIAL

## 2023-03-09 VITALS
SYSTOLIC BLOOD PRESSURE: 117 MMHG | TEMPERATURE: 97.7 F | RESPIRATION RATE: 20 BRPM | WEIGHT: 197.75 LBS | BODY MASS INDEX: 31.78 KG/M2 | DIASTOLIC BLOOD PRESSURE: 77 MMHG | OXYGEN SATURATION: 93 % | HEART RATE: 99 BPM | HEIGHT: 66 IN

## 2023-03-09 LAB
ALBUMIN SERPL-MCNC: 3.1 G/DL (ref 3.5–5)
ALBUMIN/GLOB SERPL: 0.9 (ref 1.1–2.2)
ALP SERPL-CCNC: 48 U/L (ref 45–117)
ALT SERPL-CCNC: 31 U/L (ref 12–78)
ANION GAP SERPL CALC-SCNC: 2 MMOL/L (ref 5–15)
AST SERPL-CCNC: 23 U/L (ref 15–37)
BASOPHILS # BLD: 0 K/UL (ref 0–0.1)
BASOPHILS NFR BLD: 0 % (ref 0–1)
BILIRUB SERPL-MCNC: 0.4 MG/DL (ref 0.2–1)
BNP SERPL-MCNC: 47 PG/ML
BUN SERPL-MCNC: 10 MG/DL (ref 6–20)
BUN/CREAT SERPL: 13 (ref 12–20)
CALCIUM SERPL-MCNC: 8.6 MG/DL (ref 8.5–10.1)
CHLORIDE SERPL-SCNC: 108 MMOL/L (ref 97–108)
CO2 SERPL-SCNC: 30 MMOL/L (ref 21–32)
CREAT SERPL-MCNC: 0.75 MG/DL (ref 0.55–1.02)
D DIMER PPP FEU-MCNC: 0.56 MG/L FEU (ref 0–0.65)
DIFFERENTIAL METHOD BLD: NORMAL
EOSINOPHIL # BLD: 0.2 K/UL (ref 0–0.4)
EOSINOPHIL NFR BLD: 2 % (ref 0–7)
ERYTHROCYTE [DISTWIDTH] IN BLOOD BY AUTOMATED COUNT: 12.4 % (ref 11.5–14.5)
GLOBULIN SER CALC-MCNC: 3.6 G/DL (ref 2–4)
GLUCOSE SERPL-MCNC: 129 MG/DL (ref 65–100)
HCT VFR BLD AUTO: 40.8 % (ref 35–47)
HGB BLD-MCNC: 13.2 G/DL (ref 11.5–16)
IMM GRANULOCYTES # BLD AUTO: 0 K/UL (ref 0–0.04)
IMM GRANULOCYTES NFR BLD AUTO: 0 % (ref 0–0.5)
LYMPHOCYTES # BLD: 1.6 K/UL (ref 0.8–3.5)
LYMPHOCYTES NFR BLD: 22 % (ref 12–49)
MCH RBC QN AUTO: 29.1 PG (ref 26–34)
MCHC RBC AUTO-ENTMCNC: 32.4 G/DL (ref 30–36.5)
MCV RBC AUTO: 89.9 FL (ref 80–99)
MONOCYTES # BLD: 0.4 K/UL (ref 0–1)
MONOCYTES NFR BLD: 5 % (ref 5–13)
NEUTS SEG # BLD: 5.2 K/UL (ref 1.8–8)
NEUTS SEG NFR BLD: 71 % (ref 32–75)
NRBC # BLD: 0 K/UL (ref 0–0.01)
NRBC BLD-RTO: 0 PER 100 WBC
PLATELET # BLD AUTO: 270 K/UL (ref 150–400)
PMV BLD AUTO: 9.7 FL (ref 8.9–12.9)
POTASSIUM SERPL-SCNC: 3.9 MMOL/L (ref 3.5–5.1)
PROT SERPL-MCNC: 6.7 G/DL (ref 6.4–8.2)
RBC # BLD AUTO: 4.54 M/UL (ref 3.8–5.2)
SODIUM SERPL-SCNC: 140 MMOL/L (ref 136–145)
TROPONIN I SERPL HS-MCNC: 9 NG/L (ref 0–51)
WBC # BLD AUTO: 7.4 K/UL (ref 3.6–11)

## 2023-03-09 PROCEDURE — 85025 COMPLETE CBC W/AUTO DIFF WBC: CPT

## 2023-03-09 PROCEDURE — 80053 COMPREHEN METABOLIC PANEL: CPT

## 2023-03-09 PROCEDURE — 36415 COLL VENOUS BLD VENIPUNCTURE: CPT

## 2023-03-09 PROCEDURE — 83880 ASSAY OF NATRIURETIC PEPTIDE: CPT

## 2023-03-09 PROCEDURE — 93005 ELECTROCARDIOGRAM TRACING: CPT

## 2023-03-09 PROCEDURE — 84484 ASSAY OF TROPONIN QUANT: CPT

## 2023-03-09 PROCEDURE — 85379 FIBRIN DEGRADATION QUANT: CPT

## 2023-03-09 PROCEDURE — 75810000275 HC EMERGENCY DEPT VISIT NO LEVEL OF CARE

## 2023-03-10 LAB
ATRIAL RATE: 96 BPM
CALCULATED P AXIS, ECG09: 58 DEGREES
CALCULATED R AXIS, ECG10: 60 DEGREES
CALCULATED T AXIS, ECG11: 6 DEGREES
DIAGNOSIS, 93000: NORMAL
P-R INTERVAL, ECG05: 134 MS
Q-T INTERVAL, ECG07: 358 MS
QRS DURATION, ECG06: 90 MS
QTC CALCULATION (BEZET), ECG08: 452 MS
VENTRICULAR RATE, ECG03: 96 BPM

## 2023-05-18 RX ORDER — TOPIRAMATE 50 MG/1
75 TABLET, FILM COATED ORAL 2 TIMES DAILY
COMMUNITY
Start: 2022-03-24

## 2023-05-18 RX ORDER — BUPRENORPHINE AND NALOXONE 8; 2 MG/1; MG/1
1 FILM, SOLUBLE BUCCAL; SUBLINGUAL DAILY
COMMUNITY

## 2024-08-19 NOTE — BH NOTES
PRN Medication Documentation    Specific patient behavior that led to need for PRN medication: withdrawal sx  Staff interventions attempted prior to PRN being given:catapres,zofran,robaxin  PRN medication given  Cow scale  Patient response/effectiveness of PRN medication: tl aware Yes
